# Patient Record
Sex: MALE | Race: WHITE | Employment: OTHER | URBAN - METROPOLITAN AREA
[De-identification: names, ages, dates, MRNs, and addresses within clinical notes are randomized per-mention and may not be internally consistent; named-entity substitution may affect disease eponyms.]

---

## 2018-12-01 ENCOUNTER — PROCEDURE VISIT (OUTPATIENT)
Dept: CARDIOLOGY CLINIC | Age: 73
End: 2018-12-01
Payer: MEDICARE

## 2018-12-01 DIAGNOSIS — R01.1 CARDIAC MURMUR: Primary | ICD-10-CM

## 2018-12-01 LAB
LV EF: 58 %
LVEF MODALITY: NORMAL

## 2018-12-01 PROCEDURE — 93306 TTE W/DOPPLER COMPLETE: CPT | Performed by: INTERNAL MEDICINE

## 2018-12-07 ENCOUNTER — TELEPHONE (OUTPATIENT)
Dept: CARDIOLOGY CLINIC | Age: 73
End: 2018-12-07

## 2018-12-07 NOTE — TELEPHONE ENCOUNTER
Returned call to [facility] informed him that the stress showed a sclerotic aortic valve but everything else is normal. Informed him that aclerotic valve is calcification and thickening of the valve leaflets.

## 2019-06-26 ENCOUNTER — HOSPITAL ENCOUNTER (OUTPATIENT)
Age: 74
Setting detail: SPECIMEN
Discharge: HOME | End: 2019-06-26
Payer: MEDICARE

## 2019-06-26 LAB
HEPATITIS B CORE IGM ANTIBODY: ABNORMAL
HEPATITIS B SURFACE ANTIGEN: NON REACTIVE
LACTATE DEHYDROGENASE: 181 IU/L (ref 120–246)

## 2019-06-26 PROCEDURE — 87389 HIV-1 AG W/HIV-1&-2 AB AG IA: CPT

## 2019-06-26 PROCEDURE — 86705 HEP B CORE ANTIBODY IGM: CPT

## 2019-06-26 PROCEDURE — 87340 HEPATITIS B SURFACE AG IA: CPT

## 2019-06-26 PROCEDURE — 86038 ANTINUCLEAR ANTIBODIES: CPT

## 2019-06-26 PROCEDURE — 87517 HEPATITIS B DNA QUANT: CPT

## 2019-06-26 PROCEDURE — 83615 LACTATE (LD) (LDH) ENZYME: CPT

## 2019-06-26 PROCEDURE — 86430 RHEUMATOID FACTOR TEST QUAL: CPT

## 2019-06-27 LAB — HIV SCREEN: NON REACTIVE

## 2019-06-28 LAB
ANTI-NUCLEAR ANTIBODY (ANA): NORMAL
ANTI-NUCLEAR ANTIBODY (ANA): NORMAL
RHEUMATOID FACTOR: <10 IU/ML (ref 0–14)
RHEUMATOID FACTOR: NORMAL IU/ML (ref 0–14)

## 2019-06-29 LAB
HBV QNT LOG, IU/ML: NOT DETECTED LOG IU/ML
HBV QNT, IU/ML: NOT DETECTED IU/ML
INTERPRETATION: NORMAL
INTERPRETATION: NOT DETECTED

## 2019-10-11 ENCOUNTER — HOSPITAL ENCOUNTER (OUTPATIENT)
Age: 74
Setting detail: SPECIMEN
Discharge: HOME | End: 2019-10-11
Payer: MEDICARE

## 2019-10-11 LAB
ALBUMIN SERPL-MCNC: 3.9 GM/DL (ref 3.4–5)
ALP BLD-CCNC: 110 IU/L (ref 40–129)
ALT SERPL-CCNC: 32 U/L (ref 10–40)
ANION GAP SERPL CALCULATED.3IONS-SCNC: 9 MMOL/L (ref 4–16)
AST SERPL-CCNC: 43 IU/L (ref 15–37)
BILIRUB SERPL-MCNC: 0.8 MG/DL (ref 0–1)
BUN BLDV-MCNC: 10 MG/DL (ref 6–23)
CALCIUM SERPL-MCNC: 9 MG/DL (ref 8.3–10.6)
CHLORIDE BLD-SCNC: 98 MMOL/L (ref 99–110)
CO2: 27 MMOL/L (ref 21–32)
CREAT SERPL-MCNC: 0.9 MG/DL (ref 0.9–1.3)
GFR AFRICAN AMERICAN: >60 ML/MIN/1.73M2
GFR NON-AFRICAN AMERICAN: >60 ML/MIN/1.73M2
GLUCOSE BLD-MCNC: 229 MG/DL (ref 70–99)
POTASSIUM SERPL-SCNC: 4 MMOL/L (ref 3.5–5.1)
SODIUM BLD-SCNC: 134 MMOL/L (ref 135–145)
TOTAL PROTEIN: 6.6 GM/DL (ref 6.4–8.2)

## 2019-10-11 PROCEDURE — 80053 COMPREHEN METABOLIC PANEL: CPT

## 2020-04-23 PROBLEM — D75.89 OTHER SPECIFIED DISEASES OF BLOOD AND BLOOD-FORMING ORGANS: Status: ACTIVE | Noted: 2020-04-23

## 2020-04-23 PROBLEM — D69.6 THROMBOCYTOPENIA, UNSPECIFIED (HCC): Status: ACTIVE | Noted: 2020-04-23

## 2020-06-12 ENCOUNTER — HOSPITAL ENCOUNTER (OUTPATIENT)
Dept: INFUSION THERAPY | Age: 75
Discharge: HOME | End: 2020-06-12
Payer: MEDICARE

## 2020-06-12 LAB
ALBUMIN SERPL-MCNC: 4.1 GM/DL (ref 3.4–5)
ALP BLD-CCNC: 92 IU/L (ref 40–128)
ALT SERPL-CCNC: 22 U/L (ref 10–40)
ANION GAP SERPL CALCULATED.3IONS-SCNC: 8 MMOL/L (ref 4–16)
AST SERPL-CCNC: 29 IU/L (ref 15–37)
BASOPHILS ABSOLUTE: 0 K/CU MM
BASOPHILS RELATIVE PERCENT: 0.5 % (ref 0–1)
BILIRUB SERPL-MCNC: 1 MG/DL (ref 0–1)
BUN BLDV-MCNC: 11 MG/DL (ref 6–23)
CALCIUM SERPL-MCNC: 9.3 MG/DL (ref 8.3–10.6)
CHLORIDE BLD-SCNC: 104 MMOL/L (ref 99–110)
CO2: 24 MMOL/L (ref 21–32)
CREAT SERPL-MCNC: 0.7 MG/DL (ref 0.9–1.3)
DIFFERENTIAL TYPE: ABNORMAL
EOSINOPHILS ABSOLUTE: 0.3 K/CU MM
EOSINOPHILS RELATIVE PERCENT: 6.2 % (ref 0–3)
GFR AFRICAN AMERICAN: >60 ML/MIN/1.73M2
GFR NON-AFRICAN AMERICAN: >60 ML/MIN/1.73M2
GLUCOSE BLD-MCNC: 262 MG/DL (ref 70–99)
HCT VFR BLD CALC: 41.3 % (ref 42–52)
HEMOGLOBIN: 15 GM/DL (ref 13.5–18)
LYMPHOCYTES ABSOLUTE: 1.3 K/CU MM
LYMPHOCYTES RELATIVE PERCENT: 29.6 % (ref 24–44)
MCH RBC QN AUTO: 33.7 PG (ref 27–31)
MCHC RBC AUTO-ENTMCNC: 36.3 % (ref 32–36)
MCV RBC AUTO: 92.8 FL (ref 78–100)
MONOCYTES ABSOLUTE: 0.3 K/CU MM
MONOCYTES RELATIVE PERCENT: 5.9 % (ref 0–4)
PDW BLD-RTO: 13 % (ref 11.7–14.9)
PLATELET # BLD: 126 K/CU MM (ref 140–440)
PMV BLD AUTO: 11.1 FL (ref 7.5–11.1)
POTASSIUM SERPL-SCNC: 4.2 MMOL/L (ref 3.5–5.1)
RBC # BLD: 4.45 M/CU MM (ref 4.6–6.2)
SEGMENTED NEUTROPHILS ABSOLUTE COUNT: 2.4 K/CU MM
SEGMENTED NEUTROPHILS RELATIVE PERCENT: 57.8 % (ref 36–66)
SODIUM BLD-SCNC: 136 MMOL/L (ref 135–145)
TOTAL PROTEIN: 6.9 GM/DL (ref 6.4–8.2)
WBC # BLD: 4.2 K/CU MM (ref 4–10.5)

## 2020-06-12 PROCEDURE — 85025 COMPLETE CBC W/AUTO DIFF WBC: CPT

## 2020-06-12 PROCEDURE — 80053 COMPREHEN METABOLIC PANEL: CPT

## 2020-06-12 PROCEDURE — 36415 COLL VENOUS BLD VENIPUNCTURE: CPT

## 2020-06-19 ENCOUNTER — HOSPITAL ENCOUNTER (OUTPATIENT)
Dept: INFUSION THERAPY | Age: 75
Discharge: HOME | End: 2020-06-19
Payer: MEDICARE

## 2020-06-19 PROCEDURE — 99211 OFF/OP EST MAY X REQ PHY/QHP: CPT

## 2020-12-17 NOTE — PROGRESS NOTES
Patient Name: Thea Mcgrath  Patient : 1945  Patient MRN: [id number]     Primary Oncologist: Alejandra Rosario MD  Referring Provider: Liane Samson MD     Date of Service: 2020      Chief Complaint:   Chief Complaint   Patient presents with    Follow-up     He came in for follow-up visit. Patient Active Problem List:     Other specified diseases of blood and blood-forming organs     Thrombocytopenia, unspecified (HCC)     HPI:   Joseph Vega is a pleasant 78-year-old male patient who was referred for evaluation of thrombocytopenia. CMP in May 2019 was unremarkable. WBC was 3.9, hemoglobin 14.7, hematocrit 42.1, .1, platelets 90. PSA was 1.99. Hepatitis C antibody was negative. On 2019 WBC was 5.2, hemoglobin 14.6, hematocrit 40.9, platelets 81. He drinks 2 to 3 glasses of wine daily since he is retired in 2017. At present time he is cutting down. He was told that he had hepatitis B infection in the past. I reviewed his medication list.  We discussed about potential etiologies of macrocytosis and thrombocytopenia, including alcohol use, liver disease, chronic infection, bone marrow disease and/or autoimmune disorder. .  At one time he was told to have an enlarged liver. He lost weight intentionally. US of abdomen in 2019 was negative for hepatosplenomegaly. HIV screening was negative. Platelet was 90. SOCORRO and RF were negative. CBC in 2019 showed slight decrease of platelet count. No signs of bleeding. He is agreeable to recheck CBC prior to next OV. I may consider bone marrow study if platelet continues to drop. CBC in 2019 was stable. I advise to limit his wine intake to one glass a day. He wants to defer bone marrow study    Labs in 2020 was reviewed. We discussed about diet and positive attitude. He lost 30 lbs intentionally and felt better. Due to low HbA1c at 5.8, PCP discontinued metformin.   On 2020 he came in for follow-up visit. He has nonspecific rash to the right upper extremity. Family doctor has prescribed triamcinolone. He is agreeable to have blood test today. He denied any excessive bruises or bleeding. No melena or hematuria. He denied any NVD. No fever or chills. No acute pain. No depression. Past Medical History  Allergies, osteoarthritis, thrombocytopenia, diabetes, hepatitis B carrier, hypertension. Surgical History  Nasal polyps removal.    Social History  Smoking Status Former smoker, stopped smokin  He drinks 2 to 3 glasses of wine daily since he is retired in 2017. He denies any illicit drug use. He has 1 daughter and 1 grandchild. Family History  Mother had a plastic anemia/leukemia. Nephew had hemophilia. Review of Systems: \"Per interval history; otherwise 10 point ROS is negative. \"     Vital Signs:  /71 (Site: Left Upper Arm, Position: Sitting, Cuff Size: Large Adult)   Pulse 68   Temp 96.9 °F (36.1 °C) (Infrared)   Resp 16   Ht 5' 5.5\" (1.664 m)   Wt 165 lb 9.6 oz (75.1 kg)   SpO2 96%   BMI 27.14 kg/m²     Physical Exam:  CONSTITUTIONAL: awake, alert, cooperative, no apparent distress   EYES: pupils equal, round and reactive to light, sclera clear and conjunctiva normal  ENT: Normocephalic, without obvious abnormality, atraumatic  NECK: supple, symmetrical, no jugular venous distension and no carotid bruits   HEMATOLOGIC/LYMPHATIC: no cervical, supraclavicular or axillary lymphadenopathy   LUNGS: no increased work of breathing and clear to auscultation   CARDIOVASCULAR: regular rate and rhythm, normal S1 and S2, no murmur noted  ABDOMEN: normal bowel sounds x 4, soft, non-distended, non-tender, no masses palpated, no hepatosplenomegaly   MUSCULOSKELETAL: full range of motion noted, tone is normal  NEUROLOGIC: awake, alert, oriented to name, place and time. Motor skills grossly intact. SKIN: Normal skin color, texture, turgor and no jaundice.  appears intact EXTREMITIES: no LE edema. No cyanosis. Labs:  Hematology:  Lab Results   Component Value Date    WBC 6.0 12/21/2020    RBC 4.85 12/21/2020    HGB 16.2 12/21/2020    HCT 44.3 12/21/2020    MCV 91.3 12/21/2020    MCH 33.4 (H) 12/21/2020    MCHC 36.6 (H) 12/21/2020    RDW 13.1 12/21/2020    PLT 81 (L) 12/21/2020    MPV 11.1 12/21/2020    SEGSPCT 59.5 12/21/2020    EOSRELPCT 4.5 (H) 12/21/2020    BASOPCT 0.5 12/21/2020    LYMPHOPCT 26.8 12/21/2020    MONOPCT 8.7 (H) 12/21/2020    SEGSABS 3.6 12/21/2020    EOSABS 0.3 12/21/2020    BASOSABS 0.0 12/21/2020    LYMPHSABS 1.6 12/21/2020    MONOSABS 0.5 12/21/2020    DIFFTYPE AUTOMATED DIFFERENTIAL 12/21/2020     Chemistry:  Lab Results   Component Value Date     06/12/2020    K 4.2 06/12/2020     06/12/2020    CO2 24 06/12/2020    BUN 11 06/12/2020    CREATININE 0.7 (L) 06/12/2020    GLUCOSE 262 (H) 06/12/2020    CALCIUM 9.3 06/12/2020    PROT 6.9 06/12/2020    LABALBU 4.1 06/12/2020    BILITOT 1.0 06/12/2020    ALKPHOS 92 06/12/2020    AST 29 06/12/2020    ALT 22 06/12/2020    LABGLOM >60 06/12/2020    GFRAA >60 06/12/2020     Lab Results   Component Value Date     06/26/2019     Immunology:  Lab Results   Component Value Date    PROT 6.9 06/12/2020      Observations:  No data recorded      Assessment & Plan:    1. He was referred for evaluation of mild thrombocytopenia. This could be related to autoimmune mediated process. I mentioned alcohol may contribute to thrombocytopenia. He is cutting down alcohol consumption. Ultrasound of liver and spleen in July 2019 showed normal liver and spleen with calcified slenic granuloma. CBC in July 2019 showed plaltelet  ~90, LDH was normal. If he has worsening thrombocytopenia, I may consider bone marrow study. HIV screening, SOCORRO, rheumatoid factor were negative. CBC in December 2019 was reviewed. Labs in June 2020 was reviewed. I will check CBC today.   I may consider bone marrow study if platelet continues to drop. 2. We discussed about healthy diet and lifestyle. Macrocytosis could be related to alcohol intake. I advise to limit alcohol intake. He stopped drinking. Return to clinic in 3 months or sooner. All of his questions have been answered for today. Recent imaging and labs were reviewed and discussed with the patient.       Electronically signed by Griffin Coleman MD on 12/17/20 at 9:37 AM EST

## 2020-12-21 ENCOUNTER — HOSPITAL ENCOUNTER (OUTPATIENT)
Dept: INFUSION THERAPY | Age: 75
Discharge: HOME | End: 2020-12-21
Payer: MEDICARE

## 2020-12-21 ENCOUNTER — OFFICE VISIT (OUTPATIENT)
Dept: ONCOLOGY | Age: 75
End: 2020-12-21
Payer: MEDICARE

## 2020-12-21 VITALS
BODY MASS INDEX: 26.61 KG/M2 | HEIGHT: 66 IN | OXYGEN SATURATION: 96 % | RESPIRATION RATE: 16 BRPM | WEIGHT: 165.6 LBS | TEMPERATURE: 96.9 F | SYSTOLIC BLOOD PRESSURE: 136 MMHG | HEART RATE: 68 BPM | DIASTOLIC BLOOD PRESSURE: 71 MMHG

## 2020-12-21 DIAGNOSIS — D69.6 THROMBOCYTOPENIA, UNSPECIFIED (HCC): ICD-10-CM

## 2020-12-21 DIAGNOSIS — D69.6 THROMBOCYTOPENIA, UNSPECIFIED (HCC): Primary | ICD-10-CM

## 2020-12-21 LAB
ALBUMIN SERPL-MCNC: 4.2 GM/DL (ref 3.4–5)
ALP BLD-CCNC: 113 IU/L (ref 40–129)
ALT SERPL-CCNC: 31 U/L (ref 10–40)
ANION GAP SERPL CALCULATED.3IONS-SCNC: 14 MMOL/L (ref 4–16)
AST SERPL-CCNC: 35 IU/L (ref 15–37)
BASOPHILS ABSOLUTE: 0 K/CU MM
BASOPHILS RELATIVE PERCENT: 0.5 % (ref 0–1)
BILIRUB SERPL-MCNC: 0.9 MG/DL (ref 0–1)
BUN BLDV-MCNC: 7 MG/DL (ref 6–23)
CALCIUM SERPL-MCNC: 9.9 MG/DL (ref 8.3–10.6)
CHLORIDE BLD-SCNC: 105 MMOL/L (ref 99–110)
CO2: 23 MMOL/L (ref 21–32)
CREAT SERPL-MCNC: 0.8 MG/DL (ref 0.9–1.3)
DIFFERENTIAL TYPE: ABNORMAL
EOSINOPHILS ABSOLUTE: 0.3 K/CU MM
EOSINOPHILS RELATIVE PERCENT: 4.5 % (ref 0–3)
GFR AFRICAN AMERICAN: >60 ML/MIN/1.73M2
GFR NON-AFRICAN AMERICAN: >60 ML/MIN/1.73M2
GLUCOSE BLD-MCNC: 131 MG/DL (ref 70–99)
HCT VFR BLD CALC: 44.3 % (ref 42–52)
HEMOGLOBIN: 16.2 GM/DL (ref 13.5–18)
LYMPHOCYTES ABSOLUTE: 1.6 K/CU MM
LYMPHOCYTES RELATIVE PERCENT: 26.8 % (ref 24–44)
MCH RBC QN AUTO: 33.4 PG (ref 27–31)
MCHC RBC AUTO-ENTMCNC: 36.6 % (ref 32–36)
MCV RBC AUTO: 91.3 FL (ref 78–100)
MONOCYTES ABSOLUTE: 0.5 K/CU MM
MONOCYTES RELATIVE PERCENT: 8.7 % (ref 0–4)
PDW BLD-RTO: 13.1 % (ref 11.7–14.9)
PLATELET # BLD: 81 K/CU MM (ref 140–440)
PMV BLD AUTO: 11.1 FL (ref 7.5–11.1)
POTASSIUM SERPL-SCNC: 4 MMOL/L (ref 3.5–5.1)
RBC # BLD: 4.85 M/CU MM (ref 4.6–6.2)
SEGMENTED NEUTROPHILS ABSOLUTE COUNT: 3.6 K/CU MM
SEGMENTED NEUTROPHILS RELATIVE PERCENT: 59.5 % (ref 36–66)
SODIUM BLD-SCNC: 142 MMOL/L (ref 135–145)
TOTAL PROTEIN: 7.1 GM/DL (ref 6.4–8.2)
WBC # BLD: 6 K/CU MM (ref 4–10.5)

## 2020-12-21 PROCEDURE — 99213 OFFICE O/P EST LOW 20 MIN: CPT | Performed by: INTERNAL MEDICINE

## 2020-12-21 PROCEDURE — 80053 COMPREHEN METABOLIC PANEL: CPT

## 2020-12-21 PROCEDURE — 99211 OFF/OP EST MAY X REQ PHY/QHP: CPT

## 2020-12-21 PROCEDURE — 85025 COMPLETE CBC W/AUTO DIFF WBC: CPT

## 2020-12-21 PROCEDURE — 36415 COLL VENOUS BLD VENIPUNCTURE: CPT

## 2020-12-21 RX ORDER — GABAPENTIN 300 MG/1
300 CAPSULE ORAL 3 TIMES DAILY
COMMUNITY
End: 2021-10-04 | Stop reason: ALTCHOICE

## 2020-12-21 RX ORDER — TAMSULOSIN HYDROCHLORIDE 0.4 MG/1
0.4 CAPSULE ORAL DAILY
COMMUNITY

## 2020-12-21 RX ORDER — ATORVASTATIN CALCIUM 10 MG/1
10 TABLET, FILM COATED ORAL DAILY
COMMUNITY

## 2020-12-21 NOTE — PROGRESS NOTES
MA Rooming Questions  Patient: Bharti Myers  MRN: [id number]    Date: 12/21/2020        1. Do you have any new issues? yes -          2. Do you need any refills on medications?    no    3. Have you had any imaging done since your last visit?   no    4. Have you been hospitalized or seen in the emergency room since your last visit here?   no    5. Did the patient have a depression screening completed today?  No    No data recorded     PHQ-9 Given to (if applicable):               PHQ-9 Score (if applicable):                     [] Positive     []  Negative              Does question #9 need addressed (if applicable)                     [] Yes    []  No               Sheasanjeev Holt MA

## 2020-12-22 ENCOUNTER — TELEPHONE (OUTPATIENT)
Dept: ONCOLOGY | Age: 75
End: 2020-12-22

## 2020-12-22 NOTE — TELEPHONE ENCOUNTER
Patient would like for you to call him and his wife, they have some questions about his platelet counts, please call

## 2021-01-15 ENCOUNTER — TELEPHONE (OUTPATIENT)
Dept: ONCOLOGY | Age: 76
End: 2021-01-15

## 2021-01-15 NOTE — TELEPHONE ENCOUNTER
Per Dr. Arcadio Jiménez of for patient to get covid vaccine. I called and spoke to patient wife and advised. She voiced understanding.

## 2021-03-02 NOTE — PROGRESS NOTES
Patient Name: Jono Arango  Patient : 1945  Patient MRN: [id number]     Primary Oncologist: Galina Arroyo MD  Referring Provider: Baltazar Huber MD     Date of Service: 3/22/2021      Chief Complaint:   Chief Complaint   Patient presents with    Follow-up    Results     He came in for follow-up visit. Patient Active Problem List:     Other specified diseases of blood and blood-forming organs     Thrombocytopenia, unspecified (HCC)     HPI:   Elba Samson is a pleasant 62-year-old male patient who was referred for evaluation of thrombocytopenia. CMP in May 2019 was unremarkable. WBC was 3.9, hemoglobin 14.7, hematocrit 42.1, .1, platelets 90. PSA was 1.99. Hepatitis C antibody was negative. On 2019 WBC was 5.2, hemoglobin 14.6, hematocrit 40.9, platelets 81. He drinks 2 to 3 glasses of wine daily since he is retired in 2017. At present time he is cutting down. He was told that he had hepatitis B infection in the past. I reviewed his medication list.  We discussed about potential etiologies of macrocytosis and thrombocytopenia, including alcohol use, liver disease, chronic infection, bone marrow disease and/or autoimmune disorder. .  At one time he was told to have an enlarged liver. He lost weight intentionally. US of abdomen in 2019 was negative for hepatosplenomegaly. HIV screening was negative. Platelet was 90. SOCORRO and RF were negative. CBC in 2019 showed slight decrease of platelet count. I may consider bone marrow study if platelet continues to drop. CBC in 2019 was stable. I advise to limit his wine intake to one glass a day. He wants to defer bone marrow study    Labs in 2020 was reviewed. We discussed about diet and positive attitude. He lost 30 lbs intentionally and felt better. Due to low HbA1c at 5.8, PCP discontinued metformin. On 2020 when he came in for follow-up visit.   He has had hematuria in the last 2 days and also dysuria toward the end of the urinary stream.  I will put him on Cipro and refer to urologist for further work-up. On 2021 WBC was 4.9, hemoglobin 16, platelet 82. He is on Flomax. He denied taking any NSAIDs or blood thinners. Denied any nosebleed or gums bleed. He denied any NVD. No fever or chills. He denied any acute pain. No headaches or dizzy spell. No depression. Past Medical History  Allergies, osteoarthritis, thrombocytopenia, diabetes, hepatitis B carrier, hypertension. History of vasectomy. Surgical History  Nasal polyps removal.    Social History  Smoking Status Former smoker, stopped smokin  He drinks 2 to 3 glasses of wine daily since he is retired in 2017. He denies any illicit drug use. He has 1 daughter and 1 grandchild. Family History  Mother had a plastic anemia/leukemia. Nephew had hemophilia. Review of Systems: \"Per interval history; otherwise 10 point ROS is negative. \"     Vital Signs:  BP (!) 162/75 (Site: Right Upper Arm, Position: Sitting, Cuff Size: Medium Adult)   Pulse 67   Temp 98.9 °F (37.2 °C) (Temporal)   Resp 16   Ht 5' 5.5\" (1.664 m)   Wt 167 lb 6.4 oz (75.9 kg)   SpO2 98%   BMI 27.43 kg/m²     Physical Exam:  CONSTITUTIONAL: awake, alert, cooperative, no apparent distress   EYES: pupils equal, round and reactive to light, sclera clear and conjunctiva normal  ENT: Normocephalic, without obvious abnormality, atraumatic  NECK: supple, symmetrical, no jugular venous distension and no carotid bruits   HEMATOLOGIC/LYMPHATIC: no cervical, supraclavicular or axillary lymphadenopathy   LUNGS: no increased work of breathing and clear to auscultation   CARDIOVASCULAR: regular rate and rhythm, normal S1 and S2, no murmur noted  ABDOMEN: normal bowel sounds x 4, soft, non-distended, non-tender, no masses palpated, no hepatosplenomegaly   MUSCULOSKELETAL: full range of motion noted, tone is normal  NEUROLOGIC: awake, alert, oriented to name, place and time. Cranial nerves II through XII grossly intact. SKIN: Normal skin color, texture, turgor and no jaundice. appears intact   EXTREMITIES: no LE edema. No cyanosis. Labs:  Hematology:  Lab Results   Component Value Date    WBC 4.9 03/22/2021    RBC 4.81 03/22/2021    HGB 16.0 03/22/2021    HCT 43.6 03/22/2021    MCV 90.6 03/22/2021    MCH 33.3 (H) 03/22/2021    MCHC 36.7 (H) 03/22/2021    RDW 13.4 03/22/2021    PLT 82 (L) 03/22/2021    MPV 10.7 03/22/2021    SEGSPCT 55.5 03/22/2021    EOSRELPCT 4.3 (H) 03/22/2021    BASOPCT 0.4 03/22/2021    LYMPHOPCT 32.8 03/22/2021    MONOPCT 7.0 (H) 03/22/2021    SEGSABS 2.7 03/22/2021    EOSABS 0.2 03/22/2021    BASOSABS 0.0 03/22/2021    LYMPHSABS 1.6 03/22/2021    MONOSABS 0.3 03/22/2021    DIFFTYPE AUTOMATED DIFFERENTIAL 03/22/2021     Chemistry:  Lab Results   Component Value Date     12/21/2020    K 4.0 12/21/2020     12/21/2020    CO2 23 12/21/2020    BUN 7 12/21/2020    CREATININE 0.8 (L) 12/21/2020    GLUCOSE 131 (H) 12/21/2020    CALCIUM 9.9 12/21/2020    PROT 7.1 12/21/2020    LABALBU 4.2 12/21/2020    BILITOT 0.9 12/21/2020    ALKPHOS 113 12/21/2020    AST 35 12/21/2020    ALT 31 12/21/2020    LABGLOM >60 12/21/2020    GFRAA >60 12/21/2020     Lab Results   Component Value Date     06/26/2019     Immunology:  Lab Results   Component Value Date    PROT 7.1 12/21/2020      Observations:  PHQ-9 Total Score: 0 (3/22/2021 10:23 AM)      Assessment & Plan:    1. He was referred for evaluation of mild thrombocytopenia. This could be related to autoimmune mediated process. I mentioned alcohol may contribute to thrombocytopenia. He is cutting down alcohol consumption. Ultrasound of liver and spleen in July 2019 showed normal liver and spleen with calcified slenic granuloma. CBC in July 2019 showed plaltelet  ~90, LDH was normal. If he has worsening thrombocytopenia, I may consider bone marrow study.  HIV screening, SOCORRO, rheumatoid factor were negative. CBC in December 2019 was reviewed. Labs in June 2020 was reviewed. CBC in March 2021 was stable. I may consider bone marrow study if platelet continues to drop. 2.  He has episodes of hematuria with dysuria. I put him on empiric antibiotic and referred to urologist for further work-up. 3. We discussed about healthy diet and lifestyle. Macrocytosis could be related to alcohol intake. I advise to limit alcohol intake. He stopped drinking. Return to clinic in 3 weeks or sooner. All of his questions have been answered for today. Recent imaging and labs were reviewed and discussed with the patient.       Electronically signed by Alicia Kirby MD on 12/17/20 at 9:37 AM EST

## 2021-03-22 ENCOUNTER — HOSPITAL ENCOUNTER (OUTPATIENT)
Dept: INFUSION THERAPY | Age: 76
Discharge: HOME | End: 2021-03-22
Payer: MEDICARE

## 2021-03-22 ENCOUNTER — OFFICE VISIT (OUTPATIENT)
Dept: ONCOLOGY | Age: 76
End: 2021-03-22
Payer: MEDICARE

## 2021-03-22 VITALS
HEART RATE: 67 BPM | OXYGEN SATURATION: 98 % | TEMPERATURE: 98.9 F | DIASTOLIC BLOOD PRESSURE: 75 MMHG | SYSTOLIC BLOOD PRESSURE: 162 MMHG | BODY MASS INDEX: 26.9 KG/M2 | HEIGHT: 66 IN | WEIGHT: 167.4 LBS | RESPIRATION RATE: 16 BRPM

## 2021-03-22 DIAGNOSIS — D69.6 THROMBOCYTOPENIA, UNSPECIFIED (HCC): ICD-10-CM

## 2021-03-22 DIAGNOSIS — R31.9 HEMATURIA, UNSPECIFIED TYPE: Primary | ICD-10-CM

## 2021-03-22 LAB
ALBUMIN SERPL-MCNC: 4.1 GM/DL (ref 3.4–5)
ALP BLD-CCNC: 99 IU/L (ref 40–128)
ALT SERPL-CCNC: 27 U/L (ref 10–40)
ANION GAP SERPL CALCULATED.3IONS-SCNC: 10 MMOL/L (ref 4–16)
AST SERPL-CCNC: 29 IU/L (ref 15–37)
BASOPHILS ABSOLUTE: 0 K/CU MM
BASOPHILS RELATIVE PERCENT: 0.4 % (ref 0–1)
BILIRUB SERPL-MCNC: 1.1 MG/DL (ref 0–1)
BUN BLDV-MCNC: 9 MG/DL (ref 6–23)
CALCIUM SERPL-MCNC: 9.4 MG/DL (ref 8.3–10.6)
CHLORIDE BLD-SCNC: 102 MMOL/L (ref 99–110)
CO2: 27 MMOL/L (ref 21–32)
CREAT SERPL-MCNC: 0.8 MG/DL (ref 0.9–1.3)
DIFFERENTIAL TYPE: ABNORMAL
EOSINOPHILS ABSOLUTE: 0.2 K/CU MM
EOSINOPHILS RELATIVE PERCENT: 4.3 % (ref 0–3)
GFR AFRICAN AMERICAN: >60 ML/MIN/1.73M2
GFR NON-AFRICAN AMERICAN: >60 ML/MIN/1.73M2
GLUCOSE BLD-MCNC: 306 MG/DL (ref 70–99)
HCT VFR BLD CALC: 43.6 % (ref 42–52)
HEMOGLOBIN: 16 GM/DL (ref 13.5–18)
LYMPHOCYTES ABSOLUTE: 1.6 K/CU MM
LYMPHOCYTES RELATIVE PERCENT: 32.8 % (ref 24–44)
MCH RBC QN AUTO: 33.3 PG (ref 27–31)
MCHC RBC AUTO-ENTMCNC: 36.7 % (ref 32–36)
MCV RBC AUTO: 90.6 FL (ref 78–100)
MONOCYTES ABSOLUTE: 0.3 K/CU MM
MONOCYTES RELATIVE PERCENT: 7 % (ref 0–4)
PDW BLD-RTO: 13.4 % (ref 11.7–14.9)
PLATELET # BLD: 82 K/CU MM (ref 140–440)
PMV BLD AUTO: 10.7 FL (ref 7.5–11.1)
POTASSIUM SERPL-SCNC: 4 MMOL/L (ref 3.5–5.1)
RBC # BLD: 4.81 M/CU MM (ref 4.6–6.2)
SEGMENTED NEUTROPHILS ABSOLUTE COUNT: 2.7 K/CU MM
SEGMENTED NEUTROPHILS RELATIVE PERCENT: 55.5 % (ref 36–66)
SODIUM BLD-SCNC: 139 MMOL/L (ref 135–145)
TOTAL PROTEIN: 7 GM/DL (ref 6.4–8.2)
WBC # BLD: 4.9 K/CU MM (ref 4–10.5)

## 2021-03-22 PROCEDURE — 36415 COLL VENOUS BLD VENIPUNCTURE: CPT

## 2021-03-22 PROCEDURE — 80053 COMPREHEN METABOLIC PANEL: CPT

## 2021-03-22 PROCEDURE — 85025 COMPLETE CBC W/AUTO DIFF WBC: CPT

## 2021-03-22 PROCEDURE — 99214 OFFICE O/P EST MOD 30 MIN: CPT | Performed by: INTERNAL MEDICINE

## 2021-03-22 PROCEDURE — 99211 OFF/OP EST MAY X REQ PHY/QHP: CPT

## 2021-03-22 RX ORDER — CIPROFLOXACIN 250 MG/1
250 TABLET, FILM COATED ORAL 2 TIMES DAILY
Qty: 10 TABLET | Refills: 0 | Status: SHIPPED | OUTPATIENT
Start: 2021-03-22 | End: 2021-03-27

## 2021-03-22 RX ORDER — BLOOD SUGAR DIAGNOSTIC
STRIP MISCELLANEOUS
COMMUNITY
Start: 2021-01-25 | End: 2022-08-04

## 2021-03-22 RX ORDER — UBIQUINOL 100 MG
CAPSULE ORAL
COMMUNITY
Start: 2021-01-25 | End: 2022-08-04

## 2021-03-22 ASSESSMENT — PATIENT HEALTH QUESTIONNAIRE - PHQ9
SUM OF ALL RESPONSES TO PHQ9 QUESTIONS 1 & 2: 0
SUM OF ALL RESPONSES TO PHQ QUESTIONS 1-9: 0

## 2021-03-22 NOTE — PROGRESS NOTES
MA Rooming Questions  Patient: Marion Mcmahon  MRN: [id number]    Date: 3/22/2021        1. Do you have any new issues? yes - has some blood in urine          2. Do you need any refills on medications?    no    3. Have you had any imaging done since your last visit?   no    4. Have you been hospitalized or seen in the emergency room since your last visit here?   no    5. Did the patient have a depression screening completed today?  No    PHQ-9 Total Score: 0 (3/22/2021 10:23 AM)       PHQ-9 Given to (if applicable):               PHQ-9 Score (if applicable):                     [] Positive     []  Negative              Does question #9 need addressed (if applicable)                     [] Yes    []  No               Bernabe Salinas MA

## 2021-04-09 NOTE — PROGRESS NOTES
Patient Name: Hermila Cason  Patient : 1945  Patient MRN: [id number]     Primary Oncologist: Yanna Calix MD  Referring Provider: Anais Laird MD     Date of Service: 2021      Chief Complaint:   Chief Complaint   Patient presents with   [address]     He came in for follow-up visit. Patient Active Problem List:     Other specified diseases of blood and blood-forming organs     Thrombocytopenia, unspecified (HCC)     HPI:   Phoebe Heaton is a pleasant 80-year-old male patient who was referred for evaluation of thrombocytopenia. CMP in May 2019 was unremarkable. WBC was 3.9, hemoglobin 14.7, hematocrit 42.1, .1, platelets 90. PSA was 1.99. Hepatitis C antibody was negative. On 2019 WBC was 5.2, hemoglobin 14.6, hematocrit 40.9, platelets 81. He drinks 2 to 3 glasses of wine daily since he is retired in 2017. At present time he is cutting down. He was told that he had hepatitis B infection in the past. I reviewed his medication list.  We discussed about potential etiologies of macrocytosis and thrombocytopenia, including alcohol use, liver disease, chronic infection, bone marrow disease and/or autoimmune disorder. .  At one time he was told to have an enlarged liver. He lost weight intentionally. US of abdomen in 2019 was negative for hepatosplenomegaly. HIV screening was negative. Platelet was 90. SOCORRO and RF were negative. CBC in 2019 showed slight decrease of platelet count. I may consider bone marrow study if platelet continues to drop. CBC in 2019 was stable. I advise to limit his wine intake to one glass a day. He wants to defer bone marrow study    Labs in 2020 was reviewed. We discussed about diet and positive attitude. He lost 30 lbs intentionally and felt better. Due to low HbA1c at 5.8, PCP discontinued metformin.     On 2021 he had hematuria in the last 2 days and also dysuria toward the end of the urinary stream.  I put him on Cipro and referred to urologist for further work-up. On 2021 WBC was 4.9, hemoglobin 16, platelet 82. He is on Flomax. He denied taking any NSAIDs or blood thinners. On 2021 he came in for follow-up visit. He was seen by Dr. Jhonathan Sutton who plans to do resection of the bladder masses. 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 Since the last time, he denied further hematuria. I will check CBC and CMP today. Likely he will have cystoscopy and removal of the bladder masses on May 14, 2021. He denied any nosebleed or gums bleed. No nausea, vomiting or diarrhea. . No fever or chills. He denied any acute pain. No headaches or dizzy spell. No depression. Past Medical History  Allergies, osteoarthritis, thrombocytopenia, diabetes, hepatitis B carrier, hypertension. History of vasectomy. Surgical History  Nasal polyps removal.    Social History  Smoking Status Former smoker, stopped smokin  He drinks 2 to 3 glasses of wine daily since he is retired in 2017. He denies any illicit drug use. He has 1 daughter and 1 grandchild. Family History  Mother had a plastic anemia/leukemia. Nephew had hemophilia. Review of Systems: \"Per interval history; otherwise 10 point ROS is negative. \"     Vital Signs:  /76 (Site: Right Upper Arm, Position: Sitting, Cuff Size: Large Adult)   Pulse 62   Temp 97.8 °F (36.6 °C) (Infrared)   Resp 16   Ht 5' 5\" (1.651 m)   Wt 166 lb 3.2 oz (75.4 kg)   SpO2 95%   BMI 27.66 kg/m²     Physical Exam:  CONSTITUTIONAL: awake, alert, cooperative, no apparent distress   EYES: pupils equal, round and reactive to light, sclera clear and conjunctiva normal  ENT: Normocephalic, without obvious abnormality, atraumatic  NECK: supple, symmetrical, no jugular venous distension and no carotid bruits   HEMATOLOGIC/LYMPHATIC: no cervical, supraclavicular or axillary lymphadenopathy   LUNGS: no increased work of breathing and clear to auscultation   CARDIOVASCULAR: regular rate and rhythm, normal S1 and S2, no murmur noted  ABDOMEN: normal bowel sounds x 4, soft, non-distended, non-tender, no masses palpated, no hepatosplenomegaly   MUSCULOSKELETAL: full range of motion noted, tone is normal  NEUROLOGIC: awake, alert, oriented to name, place and time. Cranial nerves II through XII grossly intact. SKIN: Normal skin color, texture, turgor and no jaundice. appears intact   EXTREMITIES: + LE edema. No cyanosis. Labs:  Hematology:  Lab Results   Component Value Date    WBC 7.9 04/23/2021    RBC 4.96 04/23/2021    HGB 16.6 04/23/2021    HCT 45.9 04/23/2021    MCV 92.5 04/23/2021    MCH 33.5 (H) 04/23/2021    MCHC 36.2 (H) 04/23/2021    RDW 13.2 04/23/2021    PLT 76 (L) 04/23/2021    MPV 10.4 04/23/2021    SEGSPCT 67.5 (H) 04/23/2021    EOSRELPCT 2.0 04/23/2021    BASOPCT 0.4 04/23/2021    LYMPHOPCT 21.4 (L) 04/23/2021    MONOPCT 8.7 (H) 04/23/2021    SEGSABS 5.4 04/23/2021    EOSABS 0.2 04/23/2021    BASOSABS 0.0 04/23/2021    LYMPHSABS 1.7 04/23/2021    MONOSABS 0.7 04/23/2021    DIFFTYPE AUTOMATED DIFFERENTIAL 04/23/2021     Chemistry:  Lab Results   Component Value Date     03/22/2021    K 4.0 03/22/2021     03/22/2021    CO2 27 03/22/2021    BUN 9 03/22/2021    CREATININE 0.8 (L) 03/22/2021    GLUCOSE 306 (H) 03/22/2021    CALCIUM 9.4 03/22/2021    PROT 7.0 03/22/2021    LABALBU 4.1 03/22/2021    BILITOT 1.1 (H) 03/22/2021    ALKPHOS 99 03/22/2021    AST 29 03/22/2021    ALT 27 03/22/2021    LABGLOM >60 03/22/2021    GFRAA >60 03/22/2021     Lab Results   Component Value Date     06/26/2019     Immunology:  Lab Results   Component Value Date    PROT 7.0 03/22/2021      Observations:  PHQ-9 Total Score: 0 (4/23/2021 11:27 AM)      Assessment & Plan:    1. He was referred for evaluation of thrombocytopenia. This could be related to autoimmune mediated process. I mentioned alcohol may contribute to thrombocytopenia. He is cutting down alcohol consumption. Ultrasound of liver and spleen in July 2019 showed normal liver and spleen with calcified slenic granuloma. CBC in July 2019 showed plaltelet  ~90, LDH was normal. If he has worsening thrombocytopenia, I may consider bone marrow study. HIV screening, SCOORRO, rheumatoid factor were negative. CBC in December 2019 was reviewed. Labs in June 2020 was reviewed. CBC in March 2021 was stable. I may consider bone marrow study if platelet continues to drop. I will check labs today. Advised to call for the test result. 2.  He has episodes of hematuria with dysuria. He was found to have 2 bladder masses. He is scheduled for cystoscopy and removal of the bladder masses in May 2021.    3.  Macrocytosis could be related to alcohol intake. I advise to limit alcohol intake. He stopped drinking. We discussed about healthy diet and lifestyle. Return to clinic in 4 weeks or sooner. All of his questions have been answered for today. Recent imaging and labs were reviewed and discussed with the patient.       Electronically signed by Lauri Rashid MD on 12/17/20 at 9:37 AM EST

## 2021-04-23 ENCOUNTER — HOSPITAL ENCOUNTER (OUTPATIENT)
Dept: INFUSION THERAPY | Age: 76
Discharge: HOME | End: 2021-04-23
Payer: MEDICARE

## 2021-04-23 ENCOUNTER — OFFICE VISIT (OUTPATIENT)
Dept: ONCOLOGY | Age: 76
End: 2021-04-23
Payer: MEDICARE

## 2021-04-23 VITALS
BODY MASS INDEX: 27.69 KG/M2 | HEART RATE: 62 BPM | DIASTOLIC BLOOD PRESSURE: 76 MMHG | SYSTOLIC BLOOD PRESSURE: 139 MMHG | WEIGHT: 166.2 LBS | TEMPERATURE: 97.8 F | HEIGHT: 65 IN | RESPIRATION RATE: 16 BRPM | OXYGEN SATURATION: 95 %

## 2021-04-23 DIAGNOSIS — D69.6 THROMBOCYTOPENIA, UNSPECIFIED (HCC): Primary | ICD-10-CM

## 2021-04-23 DIAGNOSIS — D69.6 THROMBOCYTOPENIA, UNSPECIFIED (HCC): ICD-10-CM

## 2021-04-23 LAB
ALBUMIN SERPL-MCNC: 4.3 GM/DL (ref 3.4–5)
ALP BLD-CCNC: 103 IU/L (ref 40–128)
ALT SERPL-CCNC: 22 U/L (ref 10–40)
ANION GAP SERPL CALCULATED.3IONS-SCNC: 12 MMOL/L (ref 4–16)
AST SERPL-CCNC: 28 IU/L (ref 15–37)
BASOPHILS ABSOLUTE: 0 K/CU MM
BASOPHILS RELATIVE PERCENT: 0.4 % (ref 0–1)
BILIRUB SERPL-MCNC: 1.2 MG/DL (ref 0–1)
BUN BLDV-MCNC: 8 MG/DL (ref 6–23)
CALCIUM SERPL-MCNC: 9.4 MG/DL (ref 8.3–10.6)
CHLORIDE BLD-SCNC: 99 MMOL/L (ref 99–110)
CO2: 27 MMOL/L (ref 21–32)
CREAT SERPL-MCNC: 0.8 MG/DL (ref 0.9–1.3)
DIFFERENTIAL TYPE: ABNORMAL
EOSINOPHILS ABSOLUTE: 0.2 K/CU MM
EOSINOPHILS RELATIVE PERCENT: 2 % (ref 0–3)
GFR AFRICAN AMERICAN: >60 ML/MIN/1.73M2
GFR NON-AFRICAN AMERICAN: >60 ML/MIN/1.73M2
GLUCOSE BLD-MCNC: 143 MG/DL (ref 70–99)
HCT VFR BLD CALC: 45.9 % (ref 42–52)
HEMOGLOBIN: 16.6 GM/DL (ref 13.5–18)
LYMPHOCYTES ABSOLUTE: 1.7 K/CU MM
LYMPHOCYTES RELATIVE PERCENT: 21.4 % (ref 24–44)
MCH RBC QN AUTO: 33.5 PG (ref 27–31)
MCHC RBC AUTO-ENTMCNC: 36.2 % (ref 32–36)
MCV RBC AUTO: 92.5 FL (ref 78–100)
MONOCYTES ABSOLUTE: 0.7 K/CU MM
MONOCYTES RELATIVE PERCENT: 8.7 % (ref 0–4)
PDW BLD-RTO: 13.2 % (ref 11.7–14.9)
PLATELET # BLD: 76 K/CU MM (ref 140–440)
PMV BLD AUTO: 10.4 FL (ref 7.5–11.1)
POTASSIUM SERPL-SCNC: 4 MMOL/L (ref 3.5–5.1)
RBC # BLD: 4.96 M/CU MM (ref 4.6–6.2)
SEGMENTED NEUTROPHILS ABSOLUTE COUNT: 5.4 K/CU MM
SEGMENTED NEUTROPHILS RELATIVE PERCENT: 67.5 % (ref 36–66)
SODIUM BLD-SCNC: 138 MMOL/L (ref 135–145)
TOTAL PROTEIN: 7.2 GM/DL (ref 6.4–8.2)
WBC # BLD: 7.9 K/CU MM (ref 4–10.5)

## 2021-04-23 PROCEDURE — 99211 OFF/OP EST MAY X REQ PHY/QHP: CPT

## 2021-04-23 PROCEDURE — 99213 OFFICE O/P EST LOW 20 MIN: CPT | Performed by: INTERNAL MEDICINE

## 2021-04-23 PROCEDURE — 85025 COMPLETE CBC W/AUTO DIFF WBC: CPT

## 2021-04-23 PROCEDURE — 36415 COLL VENOUS BLD VENIPUNCTURE: CPT

## 2021-04-23 PROCEDURE — 80053 COMPREHEN METABOLIC PANEL: CPT

## 2021-04-23 ASSESSMENT — PATIENT HEALTH QUESTIONNAIRE - PHQ9
SUM OF ALL RESPONSES TO PHQ QUESTIONS 1-9: 0
SUM OF ALL RESPONSES TO PHQ9 QUESTIONS 1 & 2: 0
2. FEELING DOWN, DEPRESSED OR HOPELESS: 0
SUM OF ALL RESPONSES TO PHQ QUESTIONS 1-9: 0
SUM OF ALL RESPONSES TO PHQ QUESTIONS 1-9: 0

## 2021-04-23 NOTE — PROGRESS NOTES
MA Rooming Questions  Patient: Jas Caba  MRN: [id number]    Date: 4/23/2021        1. Do you have any new issues?   no         2. Do you need any refills on medications?    no    3. Have you had any imaging done since your last visit?   no    4. Have you been hospitalized or seen in the emergency room since your last visit here?   no    5. Did the patient have a depression screening completed today?  Yes    PHQ-9 Total Score: 0 (4/23/2021 11:27 AM)       PHQ-9 Given to (if applicable):               PHQ-9 Score (if applicable):                     [] Positive     []  Negative              Does question #9 need addressed (if applicable)                     [] Yes    []  No               Destiny Martínez CMA

## 2021-05-24 NOTE — PROGRESS NOTES
on Cipro and referred to urologist for further work-up. On 2021 WBC was 4.9, hemoglobin 16, platelet 82. He is on Flomax. He denied taking any NSAIDs or blood thinners. He was seen by Dr. Yesica Olvera who planned to do resection of the bladder masses. 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 On 2021 he came in for follow up visit. She has cystoscopy/TURBT on 2021. 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 He is scheduled for follow-up cystoscopy in 3 months. He is agreeable to have labs today. Hematuria has resolved. No nosebleed or gums bleed. Denied any nausea, vomiting or diarrhea. . No fever or chills. He denied any acute pain. No headaches or dizzy spell. No depression. Past Medical History  Allergies, osteoarthritis, thrombocytopenia, diabetes, hepatitis B carrier, hypertension. History of vasectomy. Surgical History  Nasal polyps removal.    Social History  Smoking Status Former smoker, stopped smokin  He drinks 2 to 3 glasses of wine daily since he is retired in 2017. He denies any illicit drug use. He has 1 daughter and 1 grandchild. Family History  Mother had a plastic anemia/leukemia. Nephew had hemophilia. Review of Systems: \"Per interval history; otherwise 10 point ROS is negative. \"     Vital Signs:  /77 (Site: Right Upper Arm, Position: Sitting, Cuff Size: Medium Adult)   Pulse 65   Temp 98.1 °F (36.7 °C) (Infrared)   Ht 5' 5\" (1.651 m)   Wt 166 lb 12.8 oz (75.7 kg)   SpO2 95%   BMI 27.76 kg/m²     Physical Exam:  CONSTITUTIONAL: awake, alert, cooperative, no apparent distress   EYES: pupils equal, round and reactive to light, sclera clear and conjunctiva normal  ENT: Normocephalic, without obvious abnormality, atraumatic  NECK: supple, symmetrical, no jugular venous distension and no carotid bruits   HEMATOLOGIC/LYMPHATIC: no cervical, supraclavicular or axillary lymphadenopathy   LUNGS: no increased work of breathing and clear to auscultation   CARDIOVASCULAR: regular rate and rhythm, normal S1 and S2, no murmur noted  ABDOMEN: normal bowel sounds x 4, soft, non-distended, non-tender, no masses palpated, no hepatosplenomegaly   MUSCULOSKELETAL: full range of motion noted, tone is normal  NEUROLOGIC: awake, alert, oriented to name, place and time. Cranial nerves II through XII grossly intact. SKIN: Normal skin color, texture, turgor and no jaundice. appears intact   EXTREMITIES: + LE edema. No cyanosis. Labs:  Hematology:  Lab Results   Component Value Date    WBC 6.0 06/21/2021    RBC 4.65 06/21/2021    HGB 15.4 06/21/2021    HCT 43.0 06/21/2021    MCV 92.5 06/21/2021    MCH 33.1 (H) 06/21/2021    MCHC 35.8 06/21/2021    RDW 13.1 06/21/2021    PLT 94 (L) 06/21/2021    MPV 10.2 06/21/2021    SEGSPCT 60.4 06/21/2021    EOSRELPCT 2.5 06/21/2021    BASOPCT 0.5 06/21/2021    LYMPHOPCT 27.3 06/21/2021    MONOPCT 9.3 (H) 06/21/2021    SEGSABS 3.6 06/21/2021    EOSABS 0.2 06/21/2021    BASOSABS 0.0 06/21/2021    LYMPHSABS 1.6 06/21/2021    MONOSABS 0.6 06/21/2021    DIFFTYPE AUTOMATED DIFFERENTIAL 06/21/2021     Chemistry:  Lab Results   Component Value Date     04/23/2021    K 4.0 04/23/2021    CL 99 04/23/2021    CO2 27 04/23/2021    BUN 8 04/23/2021    CREATININE 0.8 (L) 04/23/2021    GLUCOSE 143 (H) 04/23/2021    CALCIUM 9.4 04/23/2021    PROT 7.2 04/23/2021    LABALBU 4.3 04/23/2021    BILITOT 1.2 (H) 04/23/2021    ALKPHOS 103 04/23/2021    AST 28 04/23/2021    ALT 22 04/23/2021    LABGLOM >60 04/23/2021    GFRAA >60 04/23/2021     Lab Results   Component Value Date     06/26/2019     Immunology:  Lab Results   Component Value Date    PROT 7.2 04/23/2021      Observations:  No data recorded      Assessment & Plan:    1. He was referred for evaluation of thrombocytopenia. This could be related to autoimmune mediated process. I mentioned alcohol may contribute to thrombocytopenia. He is cutting down alcohol consumption. Ultrasound of liver and spleen in July 2019 showed normal liver and spleen with calcified slenic granuloma. CBC in July 2019 showed plaltelet  ~90, LDH was normal. HIV screening, SOCORRO, rheumatoid factor were negative. CBC in December 2019 was reviewed. Labs in June 2020 was reviewed. CBC in March 2021 was stable. CBC on June 21, 2021 showed platelet count of 94. WBC was 6.0 and hemoglobin 15.4. I may consider bone marrow study if platelet continues to drop. 2.  He has episodes of hematuria with dysuria. He was found to have 2 bladder masses. 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 He will have follow-up cystoscopy in 3 months. I strongly recommend he follow-up with urologist.    We discussed about healthy diet and lifestyle. Return to clinic in 6 months or sooner. All of his questions have been answered for today. Recent imaging and labs were reviewed and discussed with the patient.       Electronically signed by Guanakito Chowdhury MD on 12/17/20 at 9:37 AM EST

## 2021-06-21 ENCOUNTER — HOSPITAL ENCOUNTER (OUTPATIENT)
Dept: INFUSION THERAPY | Age: 76
Discharge: HOME | End: 2021-06-21
Payer: MEDICARE

## 2021-06-21 ENCOUNTER — OFFICE VISIT (OUTPATIENT)
Dept: ONCOLOGY | Age: 76
End: 2021-06-21
Payer: MEDICARE

## 2021-06-21 VITALS
OXYGEN SATURATION: 95 % | DIASTOLIC BLOOD PRESSURE: 77 MMHG | BODY MASS INDEX: 27.79 KG/M2 | TEMPERATURE: 98.1 F | HEIGHT: 65 IN | HEART RATE: 65 BPM | WEIGHT: 166.8 LBS | SYSTOLIC BLOOD PRESSURE: 139 MMHG

## 2021-06-21 DIAGNOSIS — D69.6 THROMBOCYTOPENIA, UNSPECIFIED (HCC): ICD-10-CM

## 2021-06-21 DIAGNOSIS — D69.6 THROMBOCYTOPENIA, UNSPECIFIED (HCC): Primary | ICD-10-CM

## 2021-06-21 LAB
ALBUMIN SERPL-MCNC: 4 GM/DL (ref 3.4–5)
ALP BLD-CCNC: 114 IU/L (ref 40–128)
ALT SERPL-CCNC: 32 U/L (ref 10–40)
ANION GAP SERPL CALCULATED.3IONS-SCNC: 11 MMOL/L (ref 4–16)
AST SERPL-CCNC: 35 IU/L (ref 15–37)
BASOPHILS ABSOLUTE: 0 K/CU MM
BASOPHILS RELATIVE PERCENT: 0.5 % (ref 0–1)
BILIRUB SERPL-MCNC: 0.9 MG/DL (ref 0–1)
BUN BLDV-MCNC: 9 MG/DL (ref 6–23)
CALCIUM SERPL-MCNC: 9.5 MG/DL (ref 8.3–10.6)
CHLORIDE BLD-SCNC: 103 MMOL/L (ref 99–110)
CO2: 24 MMOL/L (ref 21–32)
CREAT SERPL-MCNC: 0.6 MG/DL (ref 0.9–1.3)
DIFFERENTIAL TYPE: ABNORMAL
EOSINOPHILS ABSOLUTE: 0.2 K/CU MM
EOSINOPHILS RELATIVE PERCENT: 2.5 % (ref 0–3)
GFR AFRICAN AMERICAN: >60 ML/MIN/1.73M2
GFR NON-AFRICAN AMERICAN: >60 ML/MIN/1.73M2
GLUCOSE BLD-MCNC: 174 MG/DL (ref 70–99)
HCT VFR BLD CALC: 43 % (ref 42–52)
HEMOGLOBIN: 15.4 GM/DL (ref 13.5–18)
LYMPHOCYTES ABSOLUTE: 1.6 K/CU MM
LYMPHOCYTES RELATIVE PERCENT: 27.3 % (ref 24–44)
MCH RBC QN AUTO: 33.1 PG (ref 27–31)
MCHC RBC AUTO-ENTMCNC: 35.8 % (ref 32–36)
MCV RBC AUTO: 92.5 FL (ref 78–100)
MONOCYTES ABSOLUTE: 0.6 K/CU MM
MONOCYTES RELATIVE PERCENT: 9.3 % (ref 0–4)
PDW BLD-RTO: 13.1 % (ref 11.7–14.9)
PLATELET # BLD: 94 K/CU MM (ref 140–440)
PMV BLD AUTO: 10.2 FL (ref 7.5–11.1)
POTASSIUM SERPL-SCNC: 4.2 MMOL/L (ref 3.5–5.1)
RBC # BLD: 4.65 M/CU MM (ref 4.6–6.2)
SEGMENTED NEUTROPHILS ABSOLUTE COUNT: 3.6 K/CU MM
SEGMENTED NEUTROPHILS RELATIVE PERCENT: 60.4 % (ref 36–66)
SODIUM BLD-SCNC: 138 MMOL/L (ref 135–145)
TOTAL PROTEIN: 7 GM/DL (ref 6.4–8.2)
WBC # BLD: 6 K/CU MM (ref 4–10.5)

## 2021-06-21 PROCEDURE — 36415 COLL VENOUS BLD VENIPUNCTURE: CPT

## 2021-06-21 PROCEDURE — 99211 OFF/OP EST MAY X REQ PHY/QHP: CPT

## 2021-06-21 PROCEDURE — 99214 OFFICE O/P EST MOD 30 MIN: CPT | Performed by: INTERNAL MEDICINE

## 2021-06-21 PROCEDURE — 80053 COMPREHEN METABOLIC PANEL: CPT

## 2021-06-21 PROCEDURE — 85025 COMPLETE CBC W/AUTO DIFF WBC: CPT

## 2021-06-21 NOTE — PROGRESS NOTES
MA Rooming Questions  Patient: Diandra Allan  MRN: [id number]    Date: 6/21/2021        1. Do you have any new issues?   no         2. Do you need any refills on medications?    no    3. Have you had any imaging done since your last visit? yes - MRI, Ct scan    4. Have you been hospitalized or seen in the emergency room since your last visit here?   no    5. Did the patient have a depression screening completed today?  No    No data recorded     PHQ-9 Given to (if applicable):               PHQ-9 Score (if applicable):                     [] Positive     []  Negative              Does question #9 need addressed (if applicable)                     [] Yes    []  No               [facility]

## 2021-10-04 ENCOUNTER — OFFICE VISIT (OUTPATIENT)
Dept: NEUROLOGY | Age: 76
End: 2021-10-04
Payer: MEDICARE

## 2021-10-04 VITALS
SYSTOLIC BLOOD PRESSURE: 112 MMHG | HEART RATE: 80 BPM | WEIGHT: 161 LBS | HEIGHT: 65 IN | DIASTOLIC BLOOD PRESSURE: 72 MMHG | BODY MASS INDEX: 26.82 KG/M2 | OXYGEN SATURATION: 97 %

## 2021-10-04 DIAGNOSIS — C67.9 MALIGNANT NEOPLASM OF URINARY BLADDER, UNSPECIFIED SITE (HCC): ICD-10-CM

## 2021-10-04 DIAGNOSIS — Z85.51 HISTORY OF BLADDER CANCER: ICD-10-CM

## 2021-10-04 DIAGNOSIS — G25.0 ESSENTIAL TREMOR: Primary | ICD-10-CM

## 2021-10-04 DIAGNOSIS — R25.9 PARKINSONIAN FEATURES: ICD-10-CM

## 2021-10-04 PROCEDURE — 99205 OFFICE O/P NEW HI 60 MIN: CPT | Performed by: STUDENT IN AN ORGANIZED HEALTH CARE EDUCATION/TRAINING PROGRAM

## 2021-10-04 RX ORDER — PRIMIDONE 50 MG/1
TABLET ORAL
Qty: 90 TABLET | Refills: 3 | Status: SHIPPED | OUTPATIENT
Start: 2021-10-04 | End: 2021-12-21

## 2021-10-04 NOTE — PROGRESS NOTES
Neurological Services Consult Note  [facility]  Patient Name: Renan Jacques  : 1945      Subjective:  77 y.o. -handed male presenting to [address] [place] [address] to be seen in consultation for tremor. He tells me that he noted this tremor starting about 1 year ago. This has slowly worsened. He is most bothered by the fact that it worsens when he is doing something with his hands. He notes that when he is holding silverware or holding plates. This worsens when he is holding his steering wheel. He can hold his hands and it seems to calm down. His handwriting has gotten messier and perhaps a little bit smaller. His wife notes that he is always had short stride, but this has also seemed to worsen over the last year. He does have history of diabetes and has been diagnosed with peripheral neuropathy. He was previously on gabapentin at nighttime however this was stopped due to this causing worsening burning sensation in his feet. He does not remember if this was helpful for the tremor. He does have history of bladder cancer. He does follow with urology. His brother does not have this type of tremor, and he is unsure of other family history of tremor. His parents passed when he was quite young. No known history of essential tremor or Parkinson disease in his family. Past Medical History:   Diagnosis Date    Essential tremor 10/4/2021    History of bladder cancer 10/4/2021    :   Past Surgical History:   Procedure Laterality Date    NASAL POLYP SURGERY      Removal of nasal polyp     Current Outpatient Medications on File Prior to Visit   Medication Sig Dispense Refill    Alcohol Swabs (ALCOHOL PREP) 70 % PADS       ONETOUCH ULTRA strip       atorvastatin (LIPITOR) 10 MG tablet Take 10 mg by mouth daily      tamsulosin (FLOMAX) 0.4 MG capsule Take 0.4 mg by mouth daily       No current facility-administered medications on file prior to visit.      Scheduled meds:  Current Outpatient Medications   Medication Sig Dispense Refill    Alcohol Swabs (ALCOHOL PREP) 70 % PADS       ONETOUCH ULTRA strip       atorvastatin (LIPITOR) 10 MG tablet Take 10 mg by mouth daily      tamsulosin (FLOMAX) 0.4 MG capsule Take 0.4 mg by mouth daily       No current facility-administered medications for this visit. Allergies   Allergen Reactions    Sulfa Antibiotics        Social History     Socioeconomic History    Marital status:      Spouse name: Not on file    Number of children: Not on file    Years of education: Not on file    Highest education level: Not on file   Occupational History    Not on file   Tobacco Use    Smoking status: Former Smoker     Packs/day: 0.50     Years: 30.00     Pack years: 15.00     Types: Cigarettes     Start date:      Quit date:      Years since quittin.7    Smokeless tobacco: Never Used   Substance and Sexual Activity    Alcohol use: Not Currently    Drug use: Never    Sexual activity: Not on file   Other Topics Concern    Not on file   Social History Narrative    Not on file     Social Determinants of Health     Financial Resource Strain:     Difficulty of Paying Living Expenses:    Food Insecurity:     Worried About [address] in the Last Year:     [address] [place] in the Last Year:    Transportation Needs:     Lack of Transportation (Medical):      Lack of Transportation (Non-Medical):    Physical Activity:     Days of Exercise per Week:     Minutes of Exercise per Session:    Stress:     Feeling of Stress :    Social Connections:     Frequency of Communication with Friends and Family:     Frequency of Social Gatherings with Friends and Family:     Attends Worship Services:     Active Member of Clubs or Organizations:     Attends Club or Organization Meetings:     Marital Status:    Intimate Partner Violence:     Fear of Current or Ex-Partner:     Emotionally Abused:     Physically Abused:     Sexually Abused:       Family history: He tells me no known history of tremors in his family, does not know much of his family history as they passed when he was quite young. Review of Symptoms:  10-point system review completed. All of which are negative except as mentioned above. Vitals:    10/04/21 0959   BP: 112/72   Pulse: 80   SpO2: 97%       Exam: Gen: A&O x 4, NAD, cooperative  HEENT: NC/AT, EOMI, PERRL, mmm, neck supple, no meningeal signs  Heart: No central cyanosis or clubbing noted  Lungs: CTAB  Abd: soft/NT/ND  Ext: no edema, no calf tenderness b/l  Endo: no thyromegaly  Psych: no depression or anxiety history  Skin: no rashes or lesions    NEUROLOGIC EXAM:  Mental Status: A&O x 4, NAD, speech clear, language fluent, comprehension intact, repetition and naming intact    Cranial Nerve Exam:   CN II-XII intact: PERRL, VFF, no nystagmus, no gaze paresis, sensation V1-V3 intact b/l, muscles of facial expression symmetric; hearing intact to conversational tone, palate elevates symmetrically, shoulder elevation symmetric and tongue protrudes midline with movement side to side.     Motor Exam:       Strength 5/5 UE's/LE's b/l  bulk normal   Some ratcheting noted in the right greater than left upper extremity primarily at the elbows, however his tremor was certainly contributing to this as well  No pronator drift    Deep Tendon Reflexes: 2/4 biceps, triceps, brachioradialis, patellar, and achilles b/l, flexor plantar responses b/l    Sensation: Intact light touch/temperature UE's/LE's b/l; hyperesthesia noted to temperature in the distal lower extremities; mildly decreased vibratory sense in distal lower extremities    Coordination/Cerebellum:       Tremors--dusting and kinetic tremor noted, right hand tremor worsened with finger to finger testing although less dramatic change with hand to face testing; leg tremor seem to improve while walking, worsened while seated, although hand tremors had both a resting feature as well as a kinetic feature      Rapidly alternating movements: no dysdiadochokinesia b/l                Finger-to-Nose: no dysmetria b/l    Gait and stance:      Gait: No ataxia; short stride noted, not a true shuffling gait this time      Romberg: not present    LABS:No results for input(s): WBC, HEMOGLOBIN, NA, CL, CO2, BUN, CREATININE, GLUCOSE, ALBUMIN, INR, PTT, CPK, CKMB, ESR, AMMONIA, UA in the last 72 hours. Invalid input(s): HEMATOCRIT, PLATELETS, POTASSIUM, CA, PT, CK1, TROP, BNAP, B12, LIPID PANEL     IMAGING: No pertinent imaging available      Other Studies:   7/9/2021  TSH 3.95 (within normal limits)  A1c 6.4  Average glucose 123    ASSESSMENT/PLAN:  Pleasant 79-year-old male seen in consultation today for tremor, with features of both an essential tremor as well as parkinsonian tremor. He does appear to be bothered by the difficulty with utilizing the hand with movement, therefore we will initially start with treatment of an essential tremor and as needed transition to treat him for parkinsonian tremor. 1. Essential tremor  1. We will trial him on low-dose primidone, 50 mg nightly for 2 weeks, then 100 mg nightly for 2 weeks then 150 mg nightly for 2 weeks if he tolerates; potential side effects for discussed with him  2. Could consider weighted silverware in the future  3. Would like to obtain a brain MRI however he is claustrophobic, will start with a head CT to evaluate any potential underlying lesions in the basal ganglia  2. Parkinsonian features  1. And atypical resting tremor noted in all extremities at various times, tremor worsening in both hands with walking without any true decreased armswing, facies appear appropriate, possible cogwheeling in the right elbow although tremor is certainly contributing  2. We will consider trial of Sinemet 10-100mg or 25-100mg TID pending how he does with the primidone  3.  Discussed that exercise and overall movements is important for either type of tremor and for overall brain health  4. Would like to obtain a brain MRI as above, however we will start with head CT due to claustrophobia  3. Discussed pt care with patient and his wife who is with him today. Mr. Star Guerra will return in 3 months time and notify us of any concerns of medications in the meantime. Thank you for allowing us to participate in the care of your patient. If there are any questions regarding evaluation please feel free to contact us.      Electronically signed by: Janice Camilo DO, 10/4/2021 12:23 PM

## 2021-10-05 ENCOUNTER — TELEPHONE (OUTPATIENT)
Dept: NEUROLOGY | Age: 76
End: 2021-10-05

## 2021-10-05 DIAGNOSIS — G20 PARKINSONIAN TREMOR (HCC): ICD-10-CM

## 2021-10-05 DIAGNOSIS — G25.0 ESSENTIAL TREMOR: Primary | ICD-10-CM

## 2021-10-05 NOTE — TELEPHONE ENCOUNTER
[address] sent a fax back saying they spoke to Abdiaziz's wife to try and schedule the CT Head w/o. Abdiaziz's wife stated he would like to do the MRI instead of the CT now after thinking about it some more. If you are okay with that can you place a new order for MRI Head?

## 2021-10-27 ENCOUNTER — TELEPHONE (OUTPATIENT)
Dept: NEUROLOGY | Age: 76
End: 2021-10-27

## 2021-10-27 NOTE — TELEPHONE ENCOUNTER
Received a fax from [place] stating insurance has denied the MRI. Please advise on what to do next.

## 2021-10-28 ENCOUNTER — TELEPHONE (OUTPATIENT)
Dept: NEUROLOGY | Age: 76
End: 2021-10-28

## 2021-10-28 DIAGNOSIS — G20 PARKINSONIAN TREMOR (HCC): Primary | ICD-10-CM

## 2021-10-28 NOTE — TELEPHONE ENCOUNTER
Patients wife called and stated that patient has not noticed a difference when taking the primidone and would like to d/c the medication.  Please advise

## 2021-11-01 NOTE — TELEPHONE ENCOUNTER
Per Dr. Kaylee Nichole she would like to discontinue CT Head and file an appeal or re submit PA for MRI. She states an MRI will evaluate more accurately any potential underlying structural lesions given parkinsonian tremor.

## 2021-11-01 NOTE — TELEPHONE ENCOUNTER
With the primidone, what dose is he at? Did he make it to 150mg? If so, he can take 100mg nightly for 10 days, then 50mg nightly for 10 days, then off    Once he is completely off the Primidone (in roughly 3 weeks), we will start low dose Sinemet.  TID. I want them to document how he does with this, if it makes any difference. Most common side effects are GI effects at low doses; higher doses can cause abnrormal movements, hallucinations.

## 2021-11-01 NOTE — TELEPHONE ENCOUNTER
I called [facility] and spoke to Milady Perrin 157 She states she will call and see if she can withdraw the CT auth and/or see if we can do a [id number] to get the MRI approved. She will call back and let me know.

## 2021-11-02 ENCOUNTER — TELEPHONE (OUTPATIENT)
Dept: NEUROLOGY | Age: 76
End: 2021-11-02

## 2021-11-02 NOTE — TELEPHONE ENCOUNTER
Patient will be d/c Primidone w/ taper to be completed 11/16/2021. At this time the patient and wife want to wait for the results of the upcoming MRI to result and due to the patients brother being on hospice in [place], [place] the patient does not want to start a new medication until he can be more local to the [facility] area. Patient scheduled for F/U in Jan w/ M. Darnella Curling. Patient instructed to call with changes in symptoms or if condition worsens.

## 2021-11-02 NOTE — TELEPHONE ENCOUNTER
Left voice mail for patient to call office back to discuss test results. HIPPA form declined test results over voicemail.

## 2021-11-03 NOTE — TELEPHONE ENCOUNTER
Francia Yan was not able to widthdraw the CT request. She gave me a number to call for reconsideration per message taken and given to me.

## 2021-11-03 NOTE — TELEPHONE ENCOUNTER
I called [facility] and was transferred multiple times to different departments. I finally spoke to a representative on the [phone] number who was able to help start the appeal process for the MRI. She states we need to send all clinical notes to them within 24 hours and that they will make a decision in 72 hours.  The reason     Y:[phone]

## 2021-11-05 NOTE — TELEPHONE ENCOUNTER
Patient is weaning down primidone currently. Due to patient being out of town while his brother is on hospice, patient is deffering new medication until he returns to the local area and the MRI is done and results.

## 2021-11-05 NOTE — TELEPHONE ENCOUNTER
Spoke to Jesse Bustamante and let her know I received the appeal approval. I told her Monday morning I will get the order sent to [facility] and they will call to get him set up.

## 2021-11-05 NOTE — TELEPHONE ENCOUNTER
Chief Complaint   Patient presents with   • Prenatal Care     nausea/fatigue/tender breast/SOB       History of Present Illness:  Vanita is a 43 year old female,       is seen today for confirmation of pregnancy. She did get an ultrasound that showed a viable IUP at 7 weeks. States that it differed by one day from her LMP. She has noticed spotting after they have had sex. Denies any other problems. We discussed AMA status and risk of aneuploidy or complications. She does have a history of  delivery with her first pregnancy. She was induced at 36 weeks for preeclampsia with her second pregnancy. Her third pregnancy she was placed on bedrest at home due to  contractions.     Patient's last menstrual period was 2017 (exact date).  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*                 Comment: x8    Abnormal Pap smear                                            Lupus                                                         IBS (irritable bowel syndrome)                                Asthma flare                                                  Hypothyroidism                                                Depression                                                  Past Surgical History:   Procedure Laterality Date   • BREAST ENHANCEMENT SURGERY     • COLPOSCOPY,LOOP ELECTRD CERVIX EXCIS     • DILATION AND CURETTAGE OF UTERUS      1   • HYSTEROSCOPY     • LYMPH NODE BIOPSY     • REMOVAL OF TONSILS,13+ Y/O       Current Outpatient Prescriptions   Medication Sig Dispense Refill   • folic acid (FOLATE) 400 MCG tablet Take 400 mcg by mouth daily.     • Prenatal MV-Min-Fe Fum-FA-DHA (PRENATAL 1 PO)      • Ferrous Sulfate (IRON) 325 (65 Fe) MG Tab Take 1 tablet by mouth every other day.     • Fiber, Guar Gum, Chew Tab Chew 1 tablet by mouth daily.     • fluticasone (FLONASE) 50 MCG/ACT nasal spray Spray 1 spray in each nostril daily. 16 g 1     No current  This is ok. Understandable he does not want to make too many changes at one time give his brother's situation. My condolences to him. facility-administered medications for this visit.      ALLERGIES:   Allergen Reactions   • Amoxicillin HIVES     asthma   • Codeine HIVES   • Latex HIVES     itchy     Social History     Social History   • Marital status:      Spouse name: angel   • Number of children: 3   • Years of education: N/A     Occupational History   •       self empolyed     Social History Main Topics   • Smoking status: Never Smoker   • Smokeless tobacco: Never Used   • Alcohol use 0.6 oz/week     1 Standard drinks or equivalent per week      Comment: social/none since pregnancy   • Drug use: No   • Sexual activity: Yes     Partners: Male     Other Topics Concern   •  Service Yes     [employer]   • Blood Transfusions No   • Caffeine Concern No     2 per wk   • Occupational Exposure No   • Hobby Hazards No   • Sleep Concern Yes     7-8   • Stress Concern Yes     pregnacy issues, job related jan- april   • Weight Concern Yes     cant lose   • Special Diet No   • Back Care Yes     bulgin disc l5-s1   • Exercise Yes     3 cardio and strength training   • Bike Helmet Yes   • Seat Belt Yes   • Self-Exams Yes     Social History Narrative         REVIEW OF SYSTEMS:    Negative for any significant problems with the following:    General: unexplained fevers or chills  Cardiovascular: chest pain, palpitations or edema  Respiratory: shortness of breath  Breasts: dominant masses or nipple discharge  GI: nausea/vomiting, diarrhea/constipation  Urinary: dysuria/hematuria  Reproductive: vaginal discharge, burning, odor or itching  Skin: new or changing nevi  Neuro: headaches  Musculoskeletal: new aches or pains      PHYSICAL EXAM:  Blood pressure 130/72, height 5' 5\" (1.651 m), weight 82.3 kg, last menstrual period 02/28/2017. Body mass index is 30.19 kg/(m^2).  General: well developed, well nourished, in no acute distress  Psych: alert and cooperative; normal mood and affect    Pelvic Exam:    External genitalia normal, no  lesions. Speculum inserted and cervix visualized. Pap smear was performed.     Transvaginal ultrasound performed. Single intrauterine pregnancy visualized. Size was measuring less than dates with crown rump length of 8 weeks 4 days. No fetal movement identified. Fetal pole was very dark and hard to visualize. No fetal heart beat could be identified.     ASSESSMENT:    42 y/o female presenting for confirmation of pregnancy, likely missed     PLAN:      Discussed that I strongly suspect a miscarriage. I am not willing to confirm solely on bedside office ultrasound. Will send for ultrasound through radiology and await results.    Follow up as needed.

## 2021-11-21 NOTE — PROGRESS NOTES
Patient Name: Otis Lopes  Patient : 1945  Patient MRN: [id number]     Primary Oncologist: Jerry Shell MD  Referring Provider: Jayda Good MD     Date of Service: 2021      Chief Complaint:   Chief Complaint   Patient presents with    Follow-up     He came in for follow-up visit. Patient Active Problem List:     Other specified diseases of blood and blood-forming organs     Thrombocytopenia, unspecified     HPI:   Chelsey Gallegos is a pleasant 78-year-old male patient who was referred for evaluation of thrombocytopenia. CMP in May 2019 was unremarkable. WBC was 3.9, hemoglobin 14.7, hematocrit 42.1, .1, platelets 90. PSA was 1.99. Hepatitis C antibody was negative. On 2019 WBC was 5.2, hemoglobin 14.6, hematocrit 40.9, platelets 81. He drinks 2 to 3 glasses of wine daily since he is retired in 2017. At present time he is cutting down. He was told that he had hepatitis B infection in the past. I reviewed his medication list.  We discussed about potential etiologies of macrocytosis and thrombocytopenia, including alcohol use, liver disease, chronic infection, bone marrow disease and/or autoimmune disorder. At one time he was told to have an enlarged liver. He lost weight intentionally. US of abdomen in 2019 was negative for hepatosplenomegaly. HIV screening was negative. Platelet was 90. SOCORRO and RF were negative. CBC in 2019 showed slight decrease of platelet count. I may consider bone marrow study if platelet continues to drop. CBC in 2019 was stable. I advise to limit his wine intake to one glass a day. He wants to defer bone marrow study    Labs in 2020 was reviewed. We discussed about diet and positive attitude. He lost 30 lbs intentionally and felt better. Due to low HbA1c at 5.8, PCP discontinued metformin. On 2021 WBC was 4.9, hemoglobin 16, platelet 82. He is on Flomax.   He denied taking any NSAIDs or blood thinners. He was seen by Dr. Willey Curling who planned to do resection of the bladder masses. 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 She has cystoscopy/TURBT on 2021. 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 He is scheduled for follow-up cystoscopy in 3 months. Hematuria has resolved. On 2021 he came in for follow up visit. CBC in 2021 was unremarkable. Reportedly cystoscopy in 2021 showed small local recurrence of the bladder cancer. This was burned. Follow-up cystoscopy in 2022 was recommended. He complained of itching on inner thighs likely related to dry skin. I recommend to use and body lotion after shower. He has tremors. On 2021 WBC was 5.2, hemoglobin 15.7, platelet 81. Mean platelet volume was 45.6. No acute pain. Denied any nausea, vomiting or diarrhea. No fever or chills. No chest pain, shortness of breath or palpitation. No headache or dizzy spell. No specific bone pain. No melena or hematochezia. Denied any dysuria or hematuria. Past Medical History  Allergies, osteoarthritis, thrombocytopenia, diabetes, hepatitis B carrier, hypertension. History of vasectomy. Surgical History  Nasal polyps removal.    Social History  Smoking Status Former smoker, stopped smokin  He drinks 2 to 3 glasses of wine daily since he is retired in 2017. He denies any illicit drug use. He has 1 daughter and 1 grandchild. Family History  Mother had aplastic anemia/leukemia. Nephew had hemophilia. Review of Systems: \"Per interval history; otherwise 10 point ROS is negative. \"     Vital Signs:  BP (!) 148/78 (Site: Right Upper Arm, Position: Sitting, Cuff Size: Medium Adult)   Pulse 73   Temp 97.8 °F (36.6 °C) (Infrared)   Resp 16 Ht 5' 5\" (1.651 m)   Wt 161 lb (73 kg)   SpO2 97%   BMI 26.79 kg/m²     Physical Exam:  CONSTITUTIONAL: awake, alert, cooperative, no apparent distress   EYES: pupils equal, round and reactive to light, sclera clear and conjunctiva normal  ENT: Normocephalic, without obvious abnormality, atraumatic  NECK: supple, symmetrical, no jugular venous distension and no carotid bruits   HEMATOLOGIC/LYMPHATIC: no cervical, supraclavicular or axillary lymphadenopathy   LUNGS: no increased work of breathing and clear to auscultation   CARDIOVASCULAR: regular rate and rhythm, normal S1 and S2, no murmur  ABDOMEN: normal bowel sound, soft, non-distended, non-tender, no masses palpated, no hepatosplenomegaly   MUSCULOSKELETAL: full range of motion noted, tone is normal  NEUROLOGIC: awake, alert, oriented to name, place and time. Motor is grossly intact. Cranial nerves II through XII grossly intact. SKIN: Normal skin color, texture, turgor and no jaundice. appears intact   EXTREMITIES: + LE edema. No cyanosis.      Labs:  Hematology:  Lab Results   Component Value Date    WBC 6.0 06/21/2021    RBC 4.65 06/21/2021    HGB 15.4 06/21/2021    HCT 43.0 06/21/2021    MCV 92.5 06/21/2021    MCH 33.1 (H) 06/21/2021    MCHC 35.8 06/21/2021    RDW 13.1 06/21/2021    PLT 94 (L) 06/21/2021    MPV 10.2 06/21/2021    SEGSPCT 60.4 06/21/2021    EOSRELPCT 2.5 06/21/2021    BASOPCT 0.5 06/21/2021    LYMPHOPCT 27.3 06/21/2021    MONOPCT 9.3 (H) 06/21/2021    SEGSABS 3.6 06/21/2021    EOSABS 0.2 06/21/2021    BASOSABS 0.0 06/21/2021    LYMPHSABS 1.6 06/21/2021    MONOSABS 0.6 06/21/2021    DIFFTYPE AUTOMATED DIFFERENTIAL 06/21/2021     Chemistry:  Lab Results   Component Value Date     07/09/2021    K 3.9 07/09/2021     07/09/2021    CO2 26 07/09/2021    BUN 11 07/09/2021    CREATININE 0.8 07/09/2021    GLUCOSE 123 07/09/2021    CALCIUM 9.6 07/09/2021    PROT 7.0 06/21/2021    LABALBU 4.4 07/09/2021    BILITOT 0.8 07/09/2021 ALKPHOS 110 07/09/2021    AST 36 07/09/2021    ALT 32 07/09/2021    LABGLOM 88 07/09/2021    GFRAA >60 06/21/2021     Lab Results   Component Value Date     06/26/2019     Immunology:  Lab Results   Component Value Date    PROT 7.0 06/21/2021      Observations:  PHQ-9 Total Score: 1 (12/21/2021 11:30 AM)      Assessment & Plan:    1. He was referred for evaluation of thrombocytopenia. This could be related to autoimmune mediated process. I mentioned alcohol may contribute to thrombocytopenia. He is cutting back alcohol consumption. Ultrasound of liver and spleen in July 2019 showed normal liver and spleen with calcified slenic granuloma. CBC in July 2019 showed plaltelet  ~90, LDH was normal. HIV screening, SOCORRO, rheumatoid factor were negative. CBC in December 2019 was reviewed. Labs in June 2020 was reviewed. CBC in March 2021 was stable. CBC on June 21, 2021 showed platelet count of 94. WBC was 6.0 and hemoglobin 15.4. CBC in December 2021 was stable with platelet at 81. I may consider bone marrow study if platelet continues to drop. 2.  He has episodes of hematuria with dysuria. He was found to have 2 bladder masses. 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 Cystoscopy and ablation of the bladder cancer in September 2021. He will have follow-up cystoscopy in 3 months. I strongly recommend he follow-up with urologist.    We discussed about healthy diet and lifestyle. Return to clinic in 6 months or sooner. All of his questions have been answered for today. Recent imaging and labs were reviewed and discussed with the patient.

## 2021-12-21 ENCOUNTER — HOSPITAL ENCOUNTER (OUTPATIENT)
Dept: INFUSION THERAPY | Age: 76
Discharge: HOME | End: 2021-12-21
Payer: MEDICARE

## 2021-12-21 ENCOUNTER — OFFICE VISIT (OUTPATIENT)
Dept: ONCOLOGY | Age: 76
End: 2021-12-21
Payer: MEDICARE

## 2021-12-21 VITALS
RESPIRATION RATE: 16 BRPM | OXYGEN SATURATION: 97 % | DIASTOLIC BLOOD PRESSURE: 78 MMHG | WEIGHT: 161 LBS | TEMPERATURE: 97.8 F | HEART RATE: 73 BPM | SYSTOLIC BLOOD PRESSURE: 148 MMHG | BODY MASS INDEX: 26.82 KG/M2 | HEIGHT: 65 IN

## 2021-12-21 DIAGNOSIS — D69.6 THROMBOCYTOPENIA, UNSPECIFIED (HCC): Primary | ICD-10-CM

## 2021-12-21 DIAGNOSIS — D69.6 THROMBOCYTOPENIA, UNSPECIFIED (HCC): ICD-10-CM

## 2021-12-21 LAB
ALBUMIN SERPL-MCNC: 4.1 GM/DL (ref 3.4–5)
ALP BLD-CCNC: 120 IU/L (ref 40–129)
ALT SERPL-CCNC: 37 U/L (ref 10–40)
ANION GAP SERPL CALCULATED.3IONS-SCNC: 9 MMOL/L (ref 4–16)
AST SERPL-CCNC: 38 IU/L (ref 15–37)
BASOPHILS ABSOLUTE: 0 K/CU MM
BASOPHILS RELATIVE PERCENT: 0.6 % (ref 0–1)
BILIRUB SERPL-MCNC: 0.8 MG/DL (ref 0–1)
BUN BLDV-MCNC: 11 MG/DL (ref 6–23)
CALCIUM SERPL-MCNC: 9.3 MG/DL (ref 8.3–10.6)
CHLORIDE BLD-SCNC: 104 MMOL/L (ref 99–110)
CO2: 26 MMOL/L (ref 21–32)
CREAT SERPL-MCNC: 0.7 MG/DL (ref 0.9–1.3)
DIFFERENTIAL TYPE: ABNORMAL
EOSINOPHILS ABSOLUTE: 0.2 K/CU MM
EOSINOPHILS RELATIVE PERCENT: 3.1 % (ref 0–3)
GFR AFRICAN AMERICAN: >60 ML/MIN/1.73M2
GFR NON-AFRICAN AMERICAN: >60 ML/MIN/1.73M2
GLUCOSE BLD-MCNC: 260 MG/DL (ref 70–99)
HCT VFR BLD CALC: 43.4 % (ref 42–52)
HEMOGLOBIN: 15.7 GM/DL (ref 13.5–18)
LYMPHOCYTES ABSOLUTE: 1.4 K/CU MM
LYMPHOCYTES RELATIVE PERCENT: 26.5 % (ref 24–44)
MCH RBC QN AUTO: 32.4 PG (ref 27–31)
MCHC RBC AUTO-ENTMCNC: 36.2 % (ref 32–36)
MCV RBC AUTO: 89.5 FL (ref 78–100)
MONOCYTES ABSOLUTE: 0.4 K/CU MM
MONOCYTES RELATIVE PERCENT: 7.4 % (ref 0–4)
PDW BLD-RTO: 13.5 % (ref 11.7–14.9)
PLATELET # BLD: 81 K/CU MM (ref 140–440)
PMV BLD AUTO: 10.5 FL (ref 7.5–11.1)
POTASSIUM SERPL-SCNC: 4.1 MMOL/L (ref 3.5–5.1)
RBC # BLD: 4.85 M/CU MM (ref 4.6–6.2)
SEGMENTED NEUTROPHILS ABSOLUTE COUNT: 3.2 K/CU MM
SEGMENTED NEUTROPHILS RELATIVE PERCENT: 62.4 % (ref 36–66)
SODIUM BLD-SCNC: 139 MMOL/L (ref 135–145)
TOTAL PROTEIN: 6.8 GM/DL (ref 6.4–8.2)
WBC # BLD: 5.2 K/CU MM (ref 4–10.5)

## 2021-12-21 PROCEDURE — 80053 COMPREHEN METABOLIC PANEL: CPT

## 2021-12-21 PROCEDURE — 99211 OFF/OP EST MAY X REQ PHY/QHP: CPT

## 2021-12-21 PROCEDURE — 85025 COMPLETE CBC W/AUTO DIFF WBC: CPT

## 2021-12-21 PROCEDURE — 99213 OFFICE O/P EST LOW 20 MIN: CPT | Performed by: INTERNAL MEDICINE

## 2021-12-21 PROCEDURE — 36415 COLL VENOUS BLD VENIPUNCTURE: CPT

## 2021-12-21 ASSESSMENT — PATIENT HEALTH QUESTIONNAIRE - PHQ9
SUM OF ALL RESPONSES TO PHQ QUESTIONS 1-9: 1
2. FEELING DOWN, DEPRESSED OR HOPELESS: 1
1. LITTLE INTEREST OR PLEASURE IN DOING THINGS: 0
SUM OF ALL RESPONSES TO PHQ QUESTIONS 1-9: 1
SUM OF ALL RESPONSES TO PHQ QUESTIONS 1-9: 1
SUM OF ALL RESPONSES TO PHQ9 QUESTIONS 1 & 2: 1

## 2021-12-21 NOTE — PROGRESS NOTES
MA Rooming Questions  Patient: Jimmie White  MRN: [id number]    Date: 12/21/2021        1. Do you have any new issues? yes - Pt c/o itching on inner thighs. Pt's dentist noticed spot on his tongue that they want to monitor. 2. Do you need any refills on medications?    no    3. Have you had any imaging done since your last visit?   no    4. Have you been hospitalized or seen in the emergency room since your last visit here?   no    5. Did the patient have a depression screening completed today?  Yes    No data recorded     PHQ-9 Given to (if applicable):               PHQ-9 Score (if applicable):                     [] Positive     [x]  Negative              Does question #9 need addressed (if applicable)                     [] Yes    []  No               Benito Hernández MA

## 2022-01-26 ENCOUNTER — OFFICE VISIT (OUTPATIENT)
Dept: NEUROLOGY | Age: 77
End: 2022-01-26
Payer: MEDICARE

## 2022-01-26 VITALS — SYSTOLIC BLOOD PRESSURE: 130 MMHG | DIASTOLIC BLOOD PRESSURE: 80 MMHG | HEART RATE: 63 BPM | OXYGEN SATURATION: 99 %

## 2022-01-26 DIAGNOSIS — G20 PARKINSONISM, UNSPECIFIED PARKINSONISM TYPE (HCC): ICD-10-CM

## 2022-01-26 DIAGNOSIS — G25.0 ESSENTIAL TREMOR: Primary | ICD-10-CM

## 2022-01-26 PROBLEM — G20.C PARKINSONISM: Status: ACTIVE | Noted: 2022-01-26

## 2022-01-26 PROCEDURE — 99214 OFFICE O/P EST MOD 30 MIN: CPT | Performed by: NURSE PRACTITIONER

## 2022-01-26 NOTE — PROGRESS NOTES
1/26/22    Jon Tampa  1945    Chief Complaint   Patient presents with    Follow-up     Parkinson's. Stopped taking primidone after about 2 week beacuse he didnt notice a difference in it. History of Present Illness  Rocky Chung is a 77 y.o. male presenting today for follow-up of:  Tremor. On last visit a  MRI of his head was ordered to r/o possible lesion in basal ganglia, MRI showed no acute findings with mild chronic vascular changes and moderate sinus disease, he has followed with ENT in the past for polyps. He was started on Primidone but has since stopped taking after increasing to 1000 mg. Sinemet 25/100 TID was suggested and prior office visit note if primidone was not useful. Prior reported symptoms of bilateral upper extremity tremor worsen with activity, handwriting a bit smaller and messier, walking with short stride which has worsened over the past year. Rocky Chung does report having increased stress and anxiety r/t death of his brother and now impending death of his brother-in-law. Current Outpatient Medications   Medication Sig Dispense Refill    Alcohol Swabs (ALCOHOL PREP) 70 % PADS       ONETOUCH ULTRA strip       atorvastatin (LIPITOR) 10 MG tablet Take 10 mg by mouth daily      tamsulosin (FLOMAX) 0.4 MG capsule Take 0.4 mg by mouth daily       No current facility-administered medications for this visit. Physical Exam:  Also present during visit: spouse.     Mental Status   Orientation: oriented to person, oriented to place, oriented to problem and oriented to time    Mood/affectappropriate mood and appropriate affect   Memory/Other: recent memory intact, remote memory intact, fund of knowledge intact, attention span normal and concentration normal  Language  Language: (normal) language, no dysarthria, (normal) articulation and no dysphasia/aphasia  Cranial Nerves   Eyes: pupils normal size and reactive to light and visual fields appear full   CN III, IV, VI : extraocular muscle strength normal, normal pursuit, no nystagmus and no ptosis   Facial Motor: normal facial motor   CN XII: tongue protrudes midline  Motor/Coordination Exam   Power: motor strength appears intact throughout, no arm drift, normal tone and Some ratcheting noted in the right greater than left upper extremity primarily at the elbows, however his tremor was certainly contributing to this as well, Finding as well on 1/26/22   Coordination: normal finger-to-nose, forearm rotation intact and rapid alternating movement normal   Sensory Exam:  Intact light touch/temperature UE's/LE's b/l; hyperesthesia noted to temperature in the distal lower extremities; mildly decreased vibratory sense in distal lower extremities No Bradykinesia, No Dyskindesia, No myoclonus, Normal strength, Normal Tone Normal bulk and Normal tone      Tremors--Resting and kinetic tremor noted, right hand tremor worsened with finger to finger testing although less dramatic change with hand to face testing; leg tremor seem to improve while walking, worsened while seated, although hand tremors had both a resting feature as well as a kinetic feature      Rapidly alternating movements: no dysdiadochokinesia b/l                Finger-to-Nose: no dysmetria b/l      Gait: No ataxia; short stride noted, not a true shuffling gait this time, bilateral UE tremor with ambulation      Romberg: not present      /80 (Site: Right Upper Arm, Position: Sitting, Cuff Size: Medium Adult)   Pulse 63   SpO2 99%     Assessment and Plan     Diagnosis Orders   1. Essential tremor     2. Parkinsonism, unspecified Parkinsonism type ([facility] [address]       We we will trial Sinemet 25/100 mg titrating up to 3 doses per day to see if this helps with tremor. Mr. Joaquin Jo does take melatonin at night 10 mg, he will hold off on the melatonin and take 1 dose of Sinemet at bedtime for 1 week so that we can see how he tolerates this medication.   Once tolerated he will then initiate a morning dose as well and again and afternoon dose once tolerating twice daily dosing. I will plan on following up with MrFreddie Montelongo again in 3 months however he will keep me informed as to how he is doing on the Sinemet. Medications prescribed for the patient were discussed in detail. This included a discussion of the potential risks versus potential benefits of the medications. The patient was given time to ask questions and these were answered to the best of my ability. The patient appeared to understand the information provided. Return in about 3 months (around 4/26/2022).     CHELLY Vergara - TIGRE

## 2022-04-26 ENCOUNTER — OFFICE VISIT (OUTPATIENT)
Dept: NEUROLOGY | Age: 77
End: 2022-04-26
Payer: MEDICARE

## 2022-04-26 VITALS
DIASTOLIC BLOOD PRESSURE: 76 MMHG | OXYGEN SATURATION: 98 % | BODY MASS INDEX: 27.39 KG/M2 | WEIGHT: 164.4 LBS | HEIGHT: 65 IN | HEART RATE: 60 BPM | SYSTOLIC BLOOD PRESSURE: 120 MMHG

## 2022-04-26 DIAGNOSIS — G20 PARKINSONISM, UNSPECIFIED PARKINSONISM TYPE (HCC): ICD-10-CM

## 2022-04-26 DIAGNOSIS — G25.0 ESSENTIAL TREMOR: Primary | ICD-10-CM

## 2022-04-26 PROCEDURE — 99214 OFFICE O/P EST MOD 30 MIN: CPT | Performed by: NURSE PRACTITIONER

## 2022-04-26 NOTE — PROGRESS NOTES
4/26/22    Roxanne Rhea  1945    Chief Complaint   Patient presents with    Tremors     pt states he still has a tremor, but he is doing better especially in the evening, pt states in the mornings is when he \"shakes\" the most       History of Present Illness  Brenda Clancy is a 77 y.o. male presenting today for follow-up of:  Tremor. On last visit he was initiated on Sinemet 25/100 mg titrating up to 3 doses per day for management of his tremor. He does feel the sinemet is helpful with his tremor. He reports walking 2 miles daily and reports his tremor improves after his walk. He participates in [employer], similar to [employer] exercising to music at a facility three times per week. Abdiaziz does admit to having anxiety and mild depression. He does feel the anxiety began once COVID-19 began. He does try to stay very active and does appear to have a very good outlook with an overall positive personality. His wife is very involved with his care. He denies having any slowness with movement or stiffness. Current Outpatient Medications   Medication Sig Dispense Refill    carbidopa-levodopa (SINEMET)  MG per tablet TAKE ONE TABLET BY MOUTH THREE TIMES A DAY 90 tablet 3    Melatonin-Pyridoxine (MELATIN PO) Take 2 mg by mouth at bedtime      Alcohol Swabs (ALCOHOL PREP) 70 % PADS       ONETOUCH ULTRA strip       atorvastatin (LIPITOR) 10 MG tablet Take 10 mg by mouth daily      tamsulosin (FLOMAX) 0.4 MG capsule Take 0.4 mg by mouth daily       No current facility-administered medications for this visit. Physical Exam:  Also present during visit: spouse.       Mental Status              Orientation: oriented to person, oriented to place, oriented to problem and oriented to time                        Mood/affectappropriate mood and appropriate affect              Memory/Other: recent memory intact, remote memory intact, fund of knowledge intact, attention span normal and concentration normal  Language  Language: (normal) language, no dysarthria, (normal) articulation and no dysphasia/aphasia  Cranial Nerves              Eyes: pupils normal size and reactive to light and visual fields appear full              CN III, IV, VI : extraocular muscle strength normal, normal pursuit, no nystagmus and no ptosis              Facial Motor: normal facial motor              CN XII: tongue protrudes midline  Motor/Coordination Exam              Power: motor strength appears intact throughout, no arm drift, normal tone and Some ratcheting noted in the right greater than left upper extremity primarily at the elbows, however his tremor was certainly contributing to this as well, Finding as well on 1/26/22              Coordination: normal finger-to-nose, forearm rotation intact and rapid alternating movement normal              Sensory Exam:  Intact light touch/temperature UE's/LE's b/l; hyperesthesia noted to temperature in the distal lower extremities; mildly decreased vibratory sense in distal lower extremities No Bradykinesia, No Dyskindesia, No myoclonus, Normal strength, Normal Tone Normal bulk and Normal tone                  Tremors--Resting and kinetic tremor noted, right hand tremor worsened with finger to finger testing although less dramatic change with hand to face testing; leg tremor seem to improve while walking, worsened while seated, although hand tremors had both a resting feature as well as a kinetic feature, intermittent resting tremor bilateral lower extremity.       Rapidly alternating movements: no dysdiadochokinesia b/l                Finger-to-Nose: no dysmetria b/l      Gait: No ataxia; short stride noted, not a true shuffling gait this time, bilateral UE tremor with ambulation      Romberg: not present        /76 (Site: Left Upper Arm, Position: Sitting, Cuff Size: Large Adult)   Pulse 60   Ht 5' 5\" (1.651 m)   Wt 164 lb 6.4 oz (74.6 kg)   SpO2 98%   BMI 27.36 kg/m² Assessment and Plan     Diagnosis Orders   1. Essential tremor     2. Parkinsonism, unspecified Parkinsonism type ([address]     Daniella Vizcaino is doing extremely well on Sinemet, he feels pleased with results so we will not make any dosing changes at this time however I do feel he would benefit from a fourth dose per day but we will discuss this on next visit. He will continue doing his exercises, he is very active and I am very happy to see this. He is tolerating medication well with no unpleasant side effects. Currently does have an underlying component of anxiety that he fully admits to. We discussed the importance of finding stress relieving activities to try to manage his anxieties a little better which will definitely help with his tremor as well. I will plan on following up with Aleja Wiggins again in 3 months, sooner if the need arises. Medications prescribed for the patient were discussed in detail. This included a discussion of the potential risks versus potential benefits of the medications. The patient was given time to ask questions and these were answered to the best of my ability. The patient appeared to understand the information provided. Return in about 3 months (around 7/26/2022).     CHELLY Robbins - TIGRE

## 2022-07-03 NOTE — PROGRESS NOTES
Patient Name: Marilee Mcdonnell  Patient : 1945  Patient MRN: [id number]     Primary Oncologist: Saman Roy MD  Referring Provider: Smita Tobar MD     Date of Service: 2022      Chief Complaint:   Chief Complaint   Patient presents with    6 Month Follow-Up     He came in for follow-up visit. Patient Active Problem List:     Other specified diseases of blood and blood-forming organs     Thrombocytopenia, unspecified     HPI:   Lila Ryan is a pleasant 80-year-old male patient who was referred for evaluation of thrombocytopenia. CMP in May 2019 was unremarkable. WBC was 3.9, hemoglobin 14.7, hematocrit 42.1, .1, platelets 90. PSA was 1.99. Hepatitis C antibody was negative. On 2019 WBC was 5.2, hemoglobin 14.6, hematocrit 40.9, platelets 81. He drinks 2 to 3 glasses of wine daily since he is retired in 2017. At present time he is cutting down. He was told that he had hepatitis B infection in the past. I reviewed his medication list.  We discussed about potential etiologies of macrocytosis and thrombocytopenia, including alcohol use, liver disease, chronic infection, bone marrow disease and/or autoimmune disorder. At one time he was told to have an enlarged liver. He lost weight intentionally. US of abdomen in 2019 was negative for hepatosplenomegaly. HIV screening was negative. Platelet was 90. SOCORRO and RF were negative. CBC in 2019 showed slight decrease of platelet count. I may consider bone marrow study if platelet continues to drop. CBC in 2019 was stable. I advise to limit his wine intake to one glass a day. He wants to defer bone marrow study    Labs in 2020 was reviewed. We discussed about diet and positive attitude. He lost 30 lbs intentionally and felt better. Due to low HbA1c at 5.8, PCP discontinued metformin. On 2021 WBC was 4.9, hemoglobin 16, platelet 82. He is on Flomax.   He denied taking any NSAIDs or blood thinners. He was seen by Dr. Gregg Brasher who planned to do resection of the bladder masses. 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 She has cystoscopy/TURBT on 2021. 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 He is scheduled for follow-up cystoscopy in 3 months. Hematuria has resolved. CBC in 2021 was unremarkable. Reportedly cystoscopy in 2021 showed small local recurrence of the bladder cancer. This was burned. Follow-up cystoscopy in 2022 was recommended. He complained of itching on inner thighs likely related to dry skin. I recommend to use and body lotion after shower. He has tremors. On 2021 WBC was 5.2, hemoglobin 15.7, platelet 81. Mean platelet volume was 76.6. On 2022 he came in for follow up visit. CBC and CMP in 2021 were grossly stable for him. Reportedly he had cystoscopy in 2022 and was told that he was cancer free. He will have follow-up with urologist in 2022. Wife was concerned about potential hematuria. We will order urinalysis study. I will check CBC today and advised to call for the test result. He stopped drinking alcohol after 2021. He refused bone marrow study at present time. No acute pain. Denied any nausea, vomiting or diarrhea. No fever or chills. No chest pain, shortness of breath or palpitation. No headache or dizzy spell. No specific bone pain. No melena or hematochezia. Denied any dysuria or hematuria. Past Medical History  Allergies, osteoarthritis, thrombocytopenia, diabetes, hepatitis B carrier, hypertension. History of vasectomy.     Surgical History  Nasal polyps removal.    Social History  Smoking Status Former smoker, stopped smokin  He drinks 2 to 3 glasses of wine daily since he is retired in 2017. He denies any illicit drug use. He has 1 daughter and 1 grandchild. Family History  Mother had aplastic anemia/leukemia. Nephew had hemophilia. Review of Systems: \"Per interval history; otherwise 10 point ROS is negative. \"     Vital Signs:  BP (!) 149/81 (Site: Right Upper Arm, Position: Sitting, Cuff Size: Medium Adult)   Pulse 66   Temp 97.7 °F (36.5 °C) (Infrared)   Resp 16   Ht 5' 5\" (1.651 m)   Wt 165 lb 9.6 oz (75.1 kg)   SpO2 97%   BMI 27.56 kg/m²     Physical Exam:  CONSTITUTIONAL: awake, alert, cooperative, no apparent distress   EYES: pupils equal and round, sclera clear and conjunctiva normal  ENT: Normocephalic, without obvious abnormality, atraumatic  NECK: supple, symmetrical, no jugular venous distension and no carotid bruits   HEMATOLOGIC/LYMPHATIC: no cervical, supraclavicular or axillary lymphadenopathy   LUNGS: no increased work of breathing and clear to auscultation   CARDIOVASCULAR: regular rate and rhythm, normal S1 and S2, no murmur  ABDOMEN: normal bowel sound, soft, non-distended, non-tender, no palpable masses, no hepatosplenomegaly   MUSCULOSKELETAL: full range of motion noted, tone is normal  NEUROLOGIC: Motor skills grossly intact. CN II - XII grossly intact. SKIN: Normal skin color, texture, turgor and no jaundice. appears intact   EXTREMITIES: + LE edema. No cyanosis.      Labs:  Hematology:  Lab Results   Component Value Date    WBC 5.4 07/06/2022    RBC 4.87 07/06/2022    HGB 15.7 07/06/2022    HCT 44.6 07/06/2022    MCV 91.6 07/06/2022    MCH 32.2 (H) 07/06/2022    MCHC 35.2 07/06/2022    RDW 13.2 07/06/2022    PLT 74 (L) 07/06/2022    MPV 10.9 07/06/2022    SEGSPCT 61.1 07/06/2022    EOSRELPCT 2.6 07/06/2022    BASOPCT 0.4 07/06/2022    LYMPHOPCT 26.4 07/06/2022    MONOPCT 9.5 (H) 07/06/2022    SEGSABS 3.3 07/06/2022    EOSABS 0.1 07/06/2022    BASOSABS 0.0 07/06/2022    LYMPHSABS 1.4 07/06/2022    MONOSABS 0.5 07/06/2022    DIFFTYPE AUTOMATED DIFFERENTIAL 07/06/2022     Chemistry:  Lab Results   Component Value Date     12/21/2021    K 4.1 12/21/2021     12/21/2021    CO2 26 12/21/2021    BUN 11 12/21/2021    CREATININE 0.7 (L) 12/21/2021    GLUCOSE 260 (H) 12/21/2021    CALCIUM 9.3 12/21/2021    PROT 6.8 12/21/2021    LABALBU 4.1 12/21/2021    BILITOT 0.8 12/21/2021    ALKPHOS 120 12/21/2021    AST 38 (H) 12/21/2021    ALT 37 12/21/2021    LABGLOM >60 12/21/2021    GFRAA >60 12/21/2021     Lab Results   Component Value Date     06/26/2019     Immunology:  Lab Results   Component Value Date    PROT 6.8 12/21/2021      Observations:  PHQ-9 Total Score: 3 (7/6/2022 10:20 AM)  Thoughts that you would be better off dead, or of hurting yourself in some way: 0 (7/6/2022 10:20 AM)      Assessment & Plan:  1. He was referred for evaluation of thrombocytopenia. This could be related to autoimmune mediated process. I mentioned alcohol may contribute to thrombocytopenia. He is cutting back alcohol consumption. Ultrasound of liver and spleen in July 2019 showed normal liver and spleen with calcified slenic granuloma. CBC in July 2019 showed plaltelet  ~90, LDH was normal. HIV screening, SOCORRO, rheumatoid factor were negative. CBC in December 2019 was reviewed. Labs in June 2020 was reviewed. CBC in March 2021 was stable. CBC on June 21, 2021 showed platelet count of 94. WBC was 6.0 and hemoglobin 15.4. CBC in December 2021 was stable with platelet at 81. On July 16, 2022 WBC was 5.4, hemoglobin 15.7, platelet 74. He refused bone marrow study. We will continue to monitor CBC. 2.  He has episodes of hematuria with dysuria. He was found to have 2 bladder masses. 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 Cystoscopy and ablation of the bladder cancer in September 2021. Reportedly cystoscopy in March 2022 was clear.   He will follow-up with

## 2022-07-06 ENCOUNTER — OFFICE VISIT (OUTPATIENT)
Dept: ONCOLOGY | Age: 77
End: 2022-07-06
Payer: MEDICARE

## 2022-07-06 ENCOUNTER — HOSPITAL ENCOUNTER (OUTPATIENT)
Dept: INFUSION THERAPY | Age: 77
Discharge: HOME | End: 2022-07-06
Payer: MEDICARE

## 2022-07-06 VITALS
OXYGEN SATURATION: 97 % | HEART RATE: 66 BPM | RESPIRATION RATE: 16 BRPM | WEIGHT: 165.6 LBS | TEMPERATURE: 97.7 F | BODY MASS INDEX: 27.59 KG/M2 | HEIGHT: 65 IN | SYSTOLIC BLOOD PRESSURE: 149 MMHG | DIASTOLIC BLOOD PRESSURE: 81 MMHG

## 2022-07-06 DIAGNOSIS — D69.6 THROMBOCYTOPENIA, UNSPECIFIED (HCC): ICD-10-CM

## 2022-07-06 DIAGNOSIS — D69.6 THROMBOCYTOPENIA, UNSPECIFIED (HCC): Primary | ICD-10-CM

## 2022-07-06 LAB
ALBUMIN SERPL-MCNC: 4.2 GM/DL (ref 3.4–5)
ALP BLD-CCNC: 111 IU/L (ref 40–129)
ALT SERPL-CCNC: 21 U/L (ref 10–40)
ANION GAP SERPL CALCULATED.3IONS-SCNC: 9 MMOL/L (ref 4–16)
AST SERPL-CCNC: 33 IU/L (ref 15–37)
BACTERIA: NEGATIVE /HPF
BASOPHILS ABSOLUTE: 0 K/CU MM
BASOPHILS RELATIVE PERCENT: 0.4 % (ref 0–1)
BILIRUB SERPL-MCNC: 1 MG/DL (ref 0–1)
BILIRUBIN URINE: NEGATIVE MG/DL
BLOOD, URINE: ABNORMAL
BUN BLDV-MCNC: 17 MG/DL (ref 6–23)
CALCIUM SERPL-MCNC: 9.3 MG/DL (ref 8.3–10.6)
CHLORIDE BLD-SCNC: 107 MMOL/L (ref 99–110)
CLARITY: CLEAR
CO2: 24 MMOL/L (ref 21–32)
COLOR: YELLOW
CREAT SERPL-MCNC: 0.7 MG/DL (ref 0.9–1.3)
DIFFERENTIAL TYPE: ABNORMAL
EOSINOPHILS ABSOLUTE: 0.1 K/CU MM
EOSINOPHILS RELATIVE PERCENT: 2.6 % (ref 0–3)
GFR AFRICAN AMERICAN: >60 ML/MIN/1.73M2
GFR NON-AFRICAN AMERICAN: >60 ML/MIN/1.73M2
GLUCOSE BLD-MCNC: 181 MG/DL (ref 70–99)
GLUCOSE, URINE: >1000 MG/DL
HCT VFR BLD CALC: 44.6 % (ref 42–52)
HEMOGLOBIN: 15.7 GM/DL (ref 13.5–18)
KETONES, URINE: NEGATIVE MG/DL
LEUKOCYTE ESTERASE, URINE: NEGATIVE
LYMPHOCYTES ABSOLUTE: 1.4 K/CU MM
LYMPHOCYTES RELATIVE PERCENT: 26.4 % (ref 24–44)
MCH RBC QN AUTO: 32.2 PG (ref 27–31)
MCHC RBC AUTO-ENTMCNC: 35.2 % (ref 32–36)
MCV RBC AUTO: 91.6 FL (ref 78–100)
MONOCYTES ABSOLUTE: 0.5 K/CU MM
MONOCYTES RELATIVE PERCENT: 9.5 % (ref 0–4)
MUCUS: ABNORMAL HPF
NITRITE URINE, QUANTITATIVE: NEGATIVE
PDW BLD-RTO: 13.2 % (ref 11.7–14.9)
PH, URINE: 5.5 (ref 5–8)
PLATELET # BLD: 74 K/CU MM (ref 140–440)
PMV BLD AUTO: 10.9 FL (ref 7.5–11.1)
POTASSIUM SERPL-SCNC: 4.1 MMOL/L (ref 3.5–5.1)
PROTEIN UA: NEGATIVE MG/DL
RBC # BLD: 4.87 M/CU MM (ref 4.6–6.2)
RBC URINE: 1 /HPF (ref 0–3)
SEGMENTED NEUTROPHILS ABSOLUTE COUNT: 3.3 K/CU MM
SEGMENTED NEUTROPHILS RELATIVE PERCENT: 61.1 % (ref 36–66)
SODIUM BLD-SCNC: 140 MMOL/L (ref 135–145)
SPECIFIC GRAVITY UA: >1.03 (ref 1–1.03)
SQUAMOUS EPITHELIAL: <1 /HPF
TOTAL PROTEIN: 6.7 GM/DL (ref 6.4–8.2)
TRICHOMONAS: ABNORMAL /HPF
UROBILINOGEN, URINE: 0.2 MG/DL (ref 0.2–1)
WBC # BLD: 5.4 K/CU MM (ref 4–10.5)
WBC UA: 1 /HPF (ref 0–2)

## 2022-07-06 PROCEDURE — 81001 URINALYSIS AUTO W/SCOPE: CPT

## 2022-07-06 PROCEDURE — 36415 COLL VENOUS BLD VENIPUNCTURE: CPT

## 2022-07-06 PROCEDURE — 85025 COMPLETE CBC W/AUTO DIFF WBC: CPT

## 2022-07-06 PROCEDURE — 99213 OFFICE O/P EST LOW 20 MIN: CPT | Performed by: INTERNAL MEDICINE

## 2022-07-06 PROCEDURE — 1123F ACP DISCUSS/DSCN MKR DOCD: CPT | Performed by: INTERNAL MEDICINE

## 2022-07-06 PROCEDURE — 80053 COMPREHEN METABOLIC PANEL: CPT

## 2022-07-06 ASSESSMENT — PATIENT HEALTH QUESTIONNAIRE - PHQ9
3. TROUBLE FALLING OR STAYING ASLEEP: 1
SUM OF ALL RESPONSES TO PHQ QUESTIONS 1-9: 3
1. LITTLE INTEREST OR PLEASURE IN DOING THINGS: 0
9. THOUGHTS THAT YOU WOULD BE BETTER OFF DEAD, OR OF HURTING YOURSELF: 0
SUM OF ALL RESPONSES TO PHQ QUESTIONS 1-9: 3
SUM OF ALL RESPONSES TO PHQ QUESTIONS 1-9: 3
10. IF YOU CHECKED OFF ANY PROBLEMS, HOW DIFFICULT HAVE THESE PROBLEMS MADE IT FOR YOU TO DO YOUR WORK, TAKE CARE OF THINGS AT HOME, OR GET ALONG WITH OTHER PEOPLE: 1
SUM OF ALL RESPONSES TO PHQ QUESTIONS 1-9: 3
SUM OF ALL RESPONSES TO PHQ9 QUESTIONS 1 & 2: 0
8. MOVING OR SPEAKING SO SLOWLY THAT OTHER PEOPLE COULD HAVE NOTICED. OR THE OPPOSITE, BEING SO FIGETY OR RESTLESS THAT YOU HAVE BEEN MOVING AROUND A LOT MORE THAN USUAL: 0
7. TROUBLE CONCENTRATING ON THINGS, SUCH AS READING THE NEWSPAPER OR WATCHING TELEVISION: 0
2. FEELING DOWN, DEPRESSED OR HOPELESS: 0
5. POOR APPETITE OR OVEREATING: 1
6. FEELING BAD ABOUT YOURSELF - OR THAT YOU ARE A FAILURE OR HAVE LET YOURSELF OR YOUR FAMILY DOWN: 0
4. FEELING TIRED OR HAVING LITTLE ENERGY: 1

## 2022-07-06 NOTE — PROGRESS NOTES
MA Rooming Questions  Patient: Erica Edge  MRN: [id number]    Date: 7/6/2022        1. Do you have any new issues?   no         2. Do you need any refills on medications?    no    3. Have you had any imaging done since your last visit?   no    4. Have you been hospitalized or seen in the emergency room since your last visit here?   no    5. Did the patient have a depression screening completed today?  Yes    PHQ-9 Total Score: 3 (7/6/2022 10:20 AM)  Thoughts that you would be better off dead, or of hurting yourself in some way: 0 (7/6/2022 10:20 AM)       PHQ-9 Given to (if applicable):               PHQ-9 Score (if applicable):                     [] Positive     []  Negative              Does question #9 need addressed (if applicable)                     [] Yes    []  No               Austin Dave CMA

## 2022-07-07 ENCOUNTER — TELEPHONE (OUTPATIENT)
Dept: ONCOLOGY | Age: 77
End: 2022-07-07

## 2022-07-07 NOTE — TELEPHONE ENCOUNTER
This RN reviewed UA with Prateek Duenas [facility] for this RN to review with patient per Dr. Aye Tovar. Called patient, no answer.

## 2022-07-07 NOTE — TELEPHONE ENCOUNTER
Patient called for his results of the UA that was done yesterday and also was asking if Asper Cream will raise your platelet levels d/t him using this, please call

## 2022-08-04 ENCOUNTER — OFFICE VISIT (OUTPATIENT)
Dept: NEUROLOGY | Age: 77
End: 2022-08-04
Payer: MEDICARE

## 2022-08-04 VITALS
HEART RATE: 69 BPM | BODY MASS INDEX: 27.86 KG/M2 | OXYGEN SATURATION: 96 % | WEIGHT: 167.2 LBS | SYSTOLIC BLOOD PRESSURE: 126 MMHG | DIASTOLIC BLOOD PRESSURE: 80 MMHG | HEIGHT: 65 IN

## 2022-08-04 DIAGNOSIS — G25.0 ESSENTIAL TREMOR: Primary | ICD-10-CM

## 2022-08-04 DIAGNOSIS — G20 PARKINSONISM, UNSPECIFIED PARKINSONISM TYPE (HCC): ICD-10-CM

## 2022-08-04 DIAGNOSIS — Z85.51 HISTORY OF BLADDER CANCER: ICD-10-CM

## 2022-08-04 PROCEDURE — 1123F ACP DISCUSS/DSCN MKR DOCD: CPT | Performed by: NURSE PRACTITIONER

## 2022-08-04 PROCEDURE — 99214 OFFICE O/P EST MOD 30 MIN: CPT | Performed by: NURSE PRACTITIONER

## 2022-08-04 NOTE — PROGRESS NOTES
8/4/22    Darby Dyer  1945    Chief Complaint   Patient presents with    Tremors       History of Present Illness  Clemetine Claude is a 77 y.o. male presenting today for follow-up of: Tremor. Currently remains on Sinemet 25/100 mg 3 times daily for management of his tremor. On last visit we did discuss possibly increasing to 4 times daily for better management. He tells me he will often  forget to take a dose or take them late. He tries to take his doses at 9, 2P, 6P. He feels his symptoms are pretty-well managed and would like to stay at [facility]. He continues participating in [employer] three times per week. Abdiaziz does have history documented on consultation visit in October 2021 of atypical resting tremor in all extremities at various times, tremor worsened in both hands with walking, possible cogwheeling in right elbow. Current Outpatient Medications   Medication Sig Dispense Refill    carbidopa-levodopa (SINEMET)  MG per tablet TAKE ONE TABLET BY MOUTH THREE TIMES A DAY 90 tablet 3    Melatonin-Pyridoxine (MELATIN PO) Take 2 mg by mouth at bedtime (Patient not taking: Reported on 7/6/2022)      Alcohol Swabs (ALCOHOL PREP) 70 % PADS       ONETOUCH ULTRA strip       atorvastatin (LIPITOR) 10 MG tablet Take 10 mg by mouth daily       tamsulosin (FLOMAX) 0.4 MG capsule Take 0.4 mg by mouth daily       No current facility-administered medications for this visit.        Physical Exam:       Mental Status              Orientation: oriented to person, oriented to place, oriented to problem and oriented to time                        Mood/affectappropriate mood and appropriate affect              Memory/Other: recent memory intact, remote memory intact, fund of knowledge intact, attention span normal and concentration normal  Language  Language: (normal) language, no dysarthria, (normal) articulation and no dysphasia/aphasia  Cranial Nerves              Eyes: pupils normal size and reactive to light and visual fields appear full              CN III, IV, VI : extraocular muscle strength normal, normal pursuit, no nystagmus and no ptosis              Facial Motor: normal facial motor              CN XII: tongue protrudes midline  Motor/Coordination Exam              Power: motor strength appears intact throughout, no arm drift, normal tone               Coordination: normal finger-to-nose, forearm rotation intact and rapid alternating movement normal              Sensory Exam:  Intact light touch/temperature UE's/LE's b/l; hyperesthesia noted to temperature in the distal lower extremities; mildly decreased vibratory sense in distal lower extremities No Bradykinesia, No Dyskindesia, No myoclonus, Normal strength, Normal Tone Normal bulk and Normal tone                  Tremors--Resting and kinetic tremor noted, right hand tremor worsened with finger to finger testing although less dramatic change with hand to face testing; leg tremor seem to improve while walking, worsened while seated, although hand tremors had both a resting feature as well as a kinetic feature, intermittent resting tremor bilateral lower extremity. Rapidly alternating movements: no dysdiadochokinesia b/l                Finger-to-Nose: no dysmetria b/l      Gait: No ataxia; short stride noted, not a true shuffling gait this time, bilateral UE tremor with ambulation      Romberg: not present      /80 (Site: Left Upper Arm, Position: Sitting, Cuff Size: Large Adult)   Pulse 69   Ht 5' 5\" (1.651 m)   Wt 167 lb 3.2 oz (75.8 kg)   SpO2 96%   BMI 27.82 kg/m²     Assessment and Plan     Diagnosis Orders   1. Essential tremor        2. Parkinsonism, unspecified Parkinsonism type ([facility] [address]        3. History of bladder cancer          We will remain on Sinemet 1 pill 3 times daily. We discussed the importance of taking his pills on a routine basis and schedule. He is doing well, exam improved from last visit with no upper extremity rigidity.   He does continue to have both resting and essential tremor in upper and lower extremities at varying intervals. He is very happy with his management at this time. I will plan on following up with Abdiaziz again in 6 months, sooner if the need arises. He will continue participating in refit 3 times weekly. Medications prescribed for the patient were discussed in detail. This included a discussion of the potential risks versus potential benefits of the medications. The patient was given time to ask questions and these were answered to the best of my ability. The patient appeared to understand the information provided. Return in about 6 months (around 2/4/2023).     CHELLY Hull NP

## 2022-12-06 NOTE — PROGRESS NOTES
Patient Name: Linda Johnston  Patient : 1945  Patient MRN: [id number]     Primary Oncologist: Patrick Carrillo MD  Referring Provider: Daniel Oquendo MD     Date of Service: 2023      Chief Complaint:   Chief Complaint   Patient presents with    Follow-up     He came in for follow-up visit. Patient Active Problem List:     Other specified diseases of blood and blood-forming organs     Thrombocytopenia, unspecified     HPI:   Mayur Maxwell is a pleasant 60-year-old male patient who was referred for evaluation of thrombocytopenia. May 2019  CMP unremarkable. WBC 3.9, hemoglobin 14.7, hematocrit 42.1, .1, platelets 90. PSA was 1.99. Hepatitis C antibody negative. 2019 WBC 5.2, hemoglobin 14.6, hematocrit 40.9, platelets 81. He drinks 2 to 3 glasses of wine daily since he is retired in 2017. At present time he is cutting down. He was told that he had hepatitis B infection in the past. I reviewed his medication list.  We discussed about potential etiologies of macrocytosis and thrombocytopenia, including alcohol use, liver disease, chronic infection, bone marrow disease and/or autoimmune disorder. At one time he was told to have an enlarged liver. He lost weight intentionally. 2019 US of abdomen negative for hepatosplenomegaly. HIV screening was negative. Platelet was 90. SOCORRO and RFnegative. 2019 decreased platelet count. I may consider bone marrow study if platelet continues to drop. 2019 CBC stable. I advise to limit his wine intake to one glass a day. He wants to defer bone marrow study    Labs in 2020 was reviewed. He lost 30 lbs intentionally and felt better. Due to low HbA1c at 5.8, PCP discontinued metformin. 2021 WBC 4.9, hemoglobin 16, platelet 82. He is on Flomax. He denied taking any NSAIDs or blood thinners. He was seen by Dr. Maribel Hernandez who planned to do resection of the bladder masses.     Ultrasound of the kidney and bladder on April 5, 2021 showed 2 adjacent urinary bladder dependent left-sided mass highly concerning for urinary bladder neoplasm, heterogeneous prominent prostate gland. She has cystoscopy/TURBT on June 1, 2021. 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 He is scheduled for follow-up cystoscopy in 3 months. Hematuria has resolved. June 2021CBC stable for him. Reportedly cystoscopy in September 2021 showed small local recurrence of the bladder cancer. This was burned. Follow-up cystoscopy in March 2022 was recommended. He complained of itching on inner thighs likely related to dry skin. I recommend to use and body lotion after shower. He has tremors. December 21, 2021 WBC 5.2, hemoglobin 15.7, platelet 81. Mean platelet volume was 84.5. CBC and CMP in December 2021 grossly stable for him. Reportedly he had cystoscopy in March 2022 and was told that he was cancer free. He will have follow-up with urologist in September 2022. Wife was concerned about potential hematuria. We will order urinalysis study. I will check CBC today and advised to call for the test result. He stopped drinking alcohol after Christmas 2021. He refused bone marrow study at present time. 1/5/2023 he came in for follow up visit. 7/2022 CMP grossly unremarkable with random . WBC 5.4, Hg 15.7, plat 74. MPV 10.9. He has thin skin. He is agreeable to have CBC today. I advise to call for  result. No acute pain. Denied any nausea, vomiting or diarrhea. No fever or chills. No chest pain, shortness of breath or palpitation. No headache or dizzy spell. No specific bone pain. No melena or hematochezia. Denied any dysuria or hematuria. Past Medical History  Allergies, osteoarthritis, thrombocytopenia, diabetes, hepatitis B carrier, hypertension. History of vasectomy.     Surgical History  Nasal polyps removal.    Social History  Smoking Status Former smoker, stopped smokin  He drinks 2 to 3 glasses of wine daily since he is retired in 2017. He denies any illicit drug use. He has 1 daughter and 1 grandchild. Family History  Mother had aplastic anemia/leukemia. Nephew had hemophilia. Review of Systems: \"Per interval history; otherwise 10 point ROS is negative. \"     Vital Signs:  BP (!) 157/77 (Site: Right Upper Arm, Position: Sitting, Cuff Size: Medium Adult)   Pulse 83   Temp 98 °F (36.7 °C) (Temporal)   Ht 5' 5\" (1.651 m)   Wt 166 lb (75.3 kg)   SpO2 97%   BMI 27.62 kg/m²     Physical Exam:  CONSTITUTIONAL: awake, alert, cooperative, no apparent distress   EYES: pupils equal and round. Sclera clear and conjunctiva normal  ENT: Normocephalic, without obvious abnormality, atraumatic  NECK: supple, symmetrical, no jugular venous distension and no carotid bruits   HEMATOLOGIC/LYMPHATIC: no cervical, supraclavicular or axillary lymphadenopathy   LUNGS: no increased work of breathing and clear to auscultation   CARDIOVASCULAR: regular rate and rhythm, normal S1 and S2, no murmur  ABDOMEN: normal bowel sound, soft, non-distended, non-tender, no palpable masses, no hepatosplenomegaly   MUSCULOSKELETAL: full range of motion noted, tone is normal  NEUROLOGIC: Motor skills grossly intact. CN II - XII grossly intact. SKIN: No jaundice. Few small bruises. EXTREMITIES: + LE edema. No cyanosis.      Labs:  Hematology:  Lab Results   Component Value Date    WBC 5.4 2022    RBC 4.87 2022    HGB 15.7 2022    HCT 44.6 2022    MCV 91.6 2022    MCH 32.2 (H) 2022    MCHC 35.2 2022    RDW 13.2 2022    PLT 74 (L) 2022    MPV 10.9 2022    SEGSPCT 61.1 2022    EOSRELPCT 2.6 2022    BASOPCT 0.4 2022    LYMPHOPCT 26.4 2022    MONOPCT 9.5 (H) 2022    SEGSABS 3.3 2022    EOSABS 0.1 2022    BASOSABS 0.0 2022    LYMPHSABS 1.4 2022    MONOSABS 0.5 2022    DIFFTYPE AUTOMATED DIFFERENTIAL 07/06/2022     Chemistry:  Lab Results   Component Value Date     07/06/2022    K 4.1 07/06/2022     07/06/2022    CO2 24 07/06/2022    BUN 17 07/06/2022    CREATININE 0.7 (L) 07/06/2022    GLUCOSE 181 (H) 07/06/2022    CALCIUM 9.3 07/06/2022    PROT 6.7 07/06/2022    LABALBU 4.2 07/06/2022    BILITOT 1.0 07/06/2022    ALKPHOS 111 07/06/2022    AST 33 07/06/2022    ALT 21 07/06/2022    LABGLOM >60 07/06/2022    GFRAA >60 07/06/2022     Lab Results   Component Value Date     06/26/2019     Immunology:  Lab Results   Component Value Date    PROT 6.7 07/06/2022      Observations:  PHQ-9 Total Score: 0 (1/5/2023  9:26 AM)      Assessment & Plan:  1. He was referred for evaluation of thrombocytopenia. This could be related to autoimmune mediated process. I mentioned alcohol may contribute to thrombocytopenia. He is cutting back alcohol consumption. Ultrasound of liver and spleen in July 2019 showed normal liver and spleen with calcified slenic granuloma. CBC in July 2019 showed plaltelet  ~90, LDH was normal. HIV screening, SOCORRO, rheumatoid factor were negative. CBC in December 2019 was reviewed. Labs in June 2020 was reviewed. CBC in March 2021 was stable. CBC on June 21, 2021 showed platelet count of 94. WBC was 6.0 and hemoglobin 15.4. CBC in December 2021 was stable with platelet at 81. July 16, 2022 WBC 5.4, hemoglobin 15.7, platelet 74. He refused bone marrow study. I will check CBC  today and advise to call for result. We will continue to monitor CBC. 2.  He has episodes of hematuria with dysuria. He was found to have 2 bladder masses. 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 Cystoscopy and ablation of the bladder cancer in September 2021. Reportedly cystoscopy in March 2022 was clear. He follows with urologist every 6 months. He has appointment in March 2023.      We discussed about healthy diet and lifestyle. Return to clinic in 6 months or sooner. All of his questions have been answered for today. Recent imaging and labs were reviewed and discussed with the patient.

## 2023-01-04 ENCOUNTER — TELEPHONE (OUTPATIENT)
Dept: NEUROLOGY | Age: 78
End: 2023-01-04

## 2023-01-04 NOTE — TELEPHONE ENCOUNTER
Patient called stating his PCP wanted us to treat his CLARISA, but stated we do not treat that here at this office only his tremors. Explained to try asking the PCP for a referral to the sleep center or pulmonology. Patient stated understanding.

## 2023-01-05 ENCOUNTER — HOSPITAL ENCOUNTER (OUTPATIENT)
Dept: INFUSION THERAPY | Age: 78
Discharge: HOME | End: 2023-01-05
Payer: MEDICARE

## 2023-01-05 ENCOUNTER — OFFICE VISIT (OUTPATIENT)
Dept: ONCOLOGY | Age: 78
End: 2023-01-05
Payer: MEDICARE

## 2023-01-05 VITALS
HEIGHT: 65 IN | HEART RATE: 83 BPM | SYSTOLIC BLOOD PRESSURE: 157 MMHG | BODY MASS INDEX: 27.66 KG/M2 | WEIGHT: 166 LBS | DIASTOLIC BLOOD PRESSURE: 77 MMHG | OXYGEN SATURATION: 97 % | TEMPERATURE: 98 F

## 2023-01-05 DIAGNOSIS — D69.6 THROMBOCYTOPENIA, UNSPECIFIED (HCC): Primary | ICD-10-CM

## 2023-01-05 DIAGNOSIS — D69.6 THROMBOCYTOPENIA, UNSPECIFIED (HCC): ICD-10-CM

## 2023-01-05 LAB
ALBUMIN SERPL-MCNC: 4.2 GM/DL (ref 3.4–5)
ALP BLD-CCNC: 108 IU/L (ref 40–129)
ALT SERPL-CCNC: 9 U/L (ref 10–40)
ANION GAP SERPL CALCULATED.3IONS-SCNC: 9 MMOL/L (ref 4–16)
AST SERPL-CCNC: 27 IU/L (ref 15–37)
BASOPHILS ABSOLUTE: 0 K/CU MM
BASOPHILS RELATIVE PERCENT: 0.7 % (ref 0–1)
BILIRUB SERPL-MCNC: 0.9 MG/DL (ref 0–1)
BUN BLDV-MCNC: 10 MG/DL (ref 6–23)
CALCIUM SERPL-MCNC: 9.1 MG/DL (ref 8.3–10.6)
CHLORIDE BLD-SCNC: 102 MMOL/L (ref 99–110)
CO2: 27 MMOL/L (ref 21–32)
CREAT SERPL-MCNC: 0.8 MG/DL (ref 0.9–1.3)
DIFFERENTIAL TYPE: ABNORMAL
EOSINOPHILS ABSOLUTE: 0.2 K/CU MM
EOSINOPHILS RELATIVE PERCENT: 4.4 % (ref 0–3)
GFR SERPL CREATININE-BSD FRML MDRD: >60 ML/MIN/1.73M2
GLUCOSE BLD-MCNC: 183 MG/DL (ref 70–99)
HCT VFR BLD CALC: 46.5 % (ref 42–52)
HEMOGLOBIN: 16.2 GM/DL (ref 13.5–18)
LYMPHOCYTES ABSOLUTE: 1.5 K/CU MM
LYMPHOCYTES RELATIVE PERCENT: 26.6 % (ref 24–44)
MCH RBC QN AUTO: 32 PG (ref 27–31)
MCHC RBC AUTO-ENTMCNC: 34.8 % (ref 32–36)
MCV RBC AUTO: 91.7 FL (ref 78–100)
MONOCYTES ABSOLUTE: 0.5 K/CU MM
MONOCYTES RELATIVE PERCENT: 8.7 % (ref 0–4)
PDW BLD-RTO: 13 % (ref 11.7–14.9)
PLATELET # BLD: 71 K/CU MM (ref 140–440)
PMV BLD AUTO: 10.9 FL (ref 7.5–11.1)
POTASSIUM SERPL-SCNC: 3.8 MMOL/L (ref 3.5–5.1)
RBC # BLD: 5.07 M/CU MM (ref 4.6–6.2)
SEGMENTED NEUTROPHILS ABSOLUTE COUNT: 3.3 K/CU MM
SEGMENTED NEUTROPHILS RELATIVE PERCENT: 59.6 % (ref 36–66)
SODIUM BLD-SCNC: 138 MMOL/L (ref 135–145)
TOTAL PROTEIN: 7.2 GM/DL (ref 6.4–8.2)
WBC # BLD: 5.5 K/CU MM (ref 4–10.5)

## 2023-01-05 PROCEDURE — 1123F ACP DISCUSS/DSCN MKR DOCD: CPT | Performed by: INTERNAL MEDICINE

## 2023-01-05 PROCEDURE — 80053 COMPREHEN METABOLIC PANEL: CPT

## 2023-01-05 PROCEDURE — 36415 COLL VENOUS BLD VENIPUNCTURE: CPT

## 2023-01-05 PROCEDURE — 99211 OFF/OP EST MAY X REQ PHY/QHP: CPT

## 2023-01-05 PROCEDURE — 99213 OFFICE O/P EST LOW 20 MIN: CPT | Performed by: INTERNAL MEDICINE

## 2023-01-05 PROCEDURE — 85025 COMPLETE CBC W/AUTO DIFF WBC: CPT

## 2023-01-05 ASSESSMENT — PATIENT HEALTH QUESTIONNAIRE - PHQ9
1. LITTLE INTEREST OR PLEASURE IN DOING THINGS: 0
SUM OF ALL RESPONSES TO PHQ QUESTIONS 1-9: 0
SUM OF ALL RESPONSES TO PHQ9 QUESTIONS 1 & 2: 0
SUM OF ALL RESPONSES TO PHQ QUESTIONS 1-9: 0
2. FEELING DOWN, DEPRESSED OR HOPELESS: 0

## 2023-01-05 NOTE — PROGRESS NOTES
MA Rooming Questions  Patient: Kasi Connor  MRN: [id number]    Date: 1/5/2023        1. Do you have any new issues?   no         2. Do you need any refills on medications?    no    3. Have you had any imaging done since your last visit?   no    4. Have you been hospitalized or seen in the emergency room since your last visit here?   no    5. Did the patient have a depression screening completed today?  Yes    PHQ-9 Total Score: 0 (1/5/2023  9:26 AM)       PHQ-9 Given to (if applicable):               PHQ-9 Score (if applicable):                     [] Positive     []  Negative              Does question #9 need addressed (if applicable)                     [] Yes    []  No               Eugenia Cohen CMA

## 2023-02-27 NOTE — TELEPHONE ENCOUNTER
Patient is due for a follow up soon, patient is also due for refill.        Requested Prescriptions     Pending Prescriptions Disp Refills    carbidopa-levodopa (SINEMET)  MG per tablet [Pharmacy Med Name: CARBIDOPA-LEVODOPA  TAB] 90 tablet 5     Sig: TAKE ONE TABLET BY MOUTH THREE TIMES A DAY

## 2023-06-21 ENCOUNTER — PROCEDURE VISIT (OUTPATIENT)
Dept: CARDIOLOGY CLINIC | Age: 78
End: 2023-06-21
Payer: MEDICARE

## 2023-06-21 DIAGNOSIS — I38 VHD (VALVULAR HEART DISEASE): Primary | ICD-10-CM

## 2023-06-21 LAB
LV EF: 53 %
LVEF MODALITY: NORMAL

## 2023-06-21 PROCEDURE — 93306 TTE W/DOPPLER COMPLETE: CPT | Performed by: INTERNAL MEDICINE

## 2023-07-11 ENCOUNTER — OFFICE VISIT (OUTPATIENT)
Dept: ONCOLOGY | Age: 78
End: 2023-07-11
Payer: MEDICARE

## 2023-07-11 ENCOUNTER — HOSPITAL ENCOUNTER (OUTPATIENT)
Dept: INFUSION THERAPY | Age: 78
Discharge: HOME | End: 2023-07-11
Payer: MEDICARE

## 2023-07-11 VITALS
DIASTOLIC BLOOD PRESSURE: 86 MMHG | TEMPERATURE: 97.5 F | HEIGHT: 65 IN | OXYGEN SATURATION: 96 % | HEART RATE: 68 BPM | RESPIRATION RATE: 16 BRPM | SYSTOLIC BLOOD PRESSURE: 152 MMHG | BODY MASS INDEX: 28.56 KG/M2 | WEIGHT: 171.4 LBS

## 2023-07-11 DIAGNOSIS — D69.6 THROMBOCYTOPENIA, UNSPECIFIED (HCC): ICD-10-CM

## 2023-07-11 DIAGNOSIS — D69.6 THROMBOCYTOPENIA, UNSPECIFIED (HCC): Primary | ICD-10-CM

## 2023-07-11 LAB
BASOPHILS ABSOLUTE: 0 K/CU MM
BASOPHILS RELATIVE PERCENT: 0.5 % (ref 0–1)
DIFFERENTIAL TYPE: ABNORMAL
EOSINOPHILS ABSOLUTE: 0.2 K/CU MM
EOSINOPHILS RELATIVE PERCENT: 3.6 % (ref 0–3)
HCT VFR BLD CALC: 43.9 % (ref 42–52)
HEMOGLOBIN: 15.5 GM/DL (ref 13.5–18)
LYMPHOCYTES ABSOLUTE: 1.1 K/CU MM
LYMPHOCYTES RELATIVE PERCENT: 26.2 % (ref 24–44)
MCH RBC QN AUTO: 32.8 PG (ref 27–31)
MCHC RBC AUTO-ENTMCNC: 35.3 % (ref 32–36)
MCV RBC AUTO: 93 FL (ref 78–100)
MONOCYTES ABSOLUTE: 0.3 K/CU MM
MONOCYTES RELATIVE PERCENT: 7.4 % (ref 0–4)
PDW BLD-RTO: 13.2 % (ref 11.7–14.9)
PLATELET # BLD: 64 K/CU MM (ref 140–440)
PMV BLD AUTO: 11.2 FL (ref 7.5–11.1)
RBC # BLD: 4.72 M/CU MM (ref 4.6–6.2)
SEGMENTED NEUTROPHILS ABSOLUTE COUNT: 2.6 K/CU MM
SEGMENTED NEUTROPHILS RELATIVE PERCENT: 62.3 % (ref 36–66)
WBC # BLD: 4.2 K/CU MM (ref 4–10.5)

## 2023-07-11 PROCEDURE — 1123F ACP DISCUSS/DSCN MKR DOCD: CPT | Performed by: INTERNAL MEDICINE

## 2023-07-11 PROCEDURE — 36415 COLL VENOUS BLD VENIPUNCTURE: CPT

## 2023-07-11 PROCEDURE — 85025 COMPLETE CBC W/AUTO DIFF WBC: CPT

## 2023-07-11 PROCEDURE — 99211 OFF/OP EST MAY X REQ PHY/QHP: CPT

## 2023-07-11 PROCEDURE — 99213 OFFICE O/P EST LOW 20 MIN: CPT | Performed by: INTERNAL MEDICINE

## 2023-07-11 RX ORDER — TAMSULOSIN HYDROCHLORIDE 0.4 MG/1
0.4 CAPSULE ORAL DAILY
COMMUNITY

## 2023-07-11 ASSESSMENT — PATIENT HEALTH QUESTIONNAIRE - PHQ9
SUM OF ALL RESPONSES TO PHQ QUESTIONS 1-9: 0
SUM OF ALL RESPONSES TO PHQ9 QUESTIONS 1 & 2: 0
SUM OF ALL RESPONSES TO PHQ QUESTIONS 1-9: 0
1. LITTLE INTEREST OR PLEASURE IN DOING THINGS: 0
2. FEELING DOWN, DEPRESSED OR HOPELESS: 0

## 2023-07-11 NOTE — PROGRESS NOTES
MA Rooming Questions  Patient: Clint Viramontes  MRN: [id number]    Date: 7/11/2023        1. Do you have any new issues? yes - questions about labs fluctuating         2. Do you need any refills on medications?    no    3. Have you had any imaging done since your last visit?   no    4. Have you been hospitalized or seen in the emergency room since your last visit here?   no    5. Did the patient have a depression screening completed today?  Yes    PHQ-9 Total Score: 0 (7/11/2023  9:36 AM)       PHQ-9 Given to (if applicable):               PHQ-9 Score (if applicable):                     [] Positive     []  Negative              Does question #9 need addressed (if applicable)                     [] Yes    []  No               Helio Trinh CMA

## 2023-09-24 NOTE — PROGRESS NOTES
Patient Name: Giselle Ceja  Patient : 1945  Patient MRN: [id number]     Primary Oncologist: Marissa Garcia MD  Referring Provider: Maxine Carlton MD     Date of Service: 10/19/2023      Chief Complaint:   Chief Complaint   Patient presents with    Follow-up     He came in for follow-up visit. Patient Active Problem List:     Other specified diseases of blood and blood-forming organs     Thrombocytopenia, unspecified     HPI:   Basim Mathew is a pleasant 60-year-old male patient who was referred for evaluation of thrombocytopenia. May 2019  CMP unremarkable. WBC 3.9, hemoglobin 14.7, hematocrit 42.1, .1, platelets 90. PSA was 1.99. Hepatitis C antibody negative. 2019 WBC 5.2, hemoglobin 14.6, hematocrit 40.9, platelets 81. He drinks 2 to 3 glasses of wine daily since he is retired in 2017. At present time he is cutting down. He was told that he had hepatitis B infection in the past. I reviewed his medication list.  We discussed about potential etiologies of macrocytosis and thrombocytopenia, including alcohol use, liver disease, chronic infection, bone marrow disease and/or autoimmune disorder. At one time he was told to have an enlarged liver. He lost weight intentionally. 2019 US of abdomen negative for hepatosplenomegaly. HIV screening was negative. Platelet was 90. SOCORRO and RFnegative. 2019 decreased platelet count. I may consider bone marrow study if platelet continues to drop. 2019 CBC stable. I advise to limit his wine intake to one glass a day. He wants to defer bone marrow study    Labs in 2020 was reviewed. He lost 30 lbs intentionally and felt better. Due to low HbA1c at 5.8, PCP discontinued metformin. 2021 WBC 4.9, hemoglobin 16, platelet 82. He is on Flomax. He denied taking any NSAIDs or blood thinners. He was seen by Dr. Angus Johnson who planned to do resection of the bladder masses.     Ultrasound of the

## 2023-10-19 ENCOUNTER — HOSPITAL ENCOUNTER (OUTPATIENT)
Dept: INFUSION THERAPY | Age: 78
Discharge: HOME | End: 2023-10-19
Payer: MEDICARE

## 2023-10-19 ENCOUNTER — OFFICE VISIT (OUTPATIENT)
Dept: ONCOLOGY | Age: 78
End: 2023-10-19
Payer: MEDICARE

## 2023-10-19 VITALS
HEIGHT: 65 IN | WEIGHT: 169.8 LBS | DIASTOLIC BLOOD PRESSURE: 70 MMHG | BODY MASS INDEX: 28.29 KG/M2 | HEART RATE: 64 BPM | OXYGEN SATURATION: 100 % | SYSTOLIC BLOOD PRESSURE: 154 MMHG | TEMPERATURE: 97.4 F

## 2023-10-19 DIAGNOSIS — D69.6 THROMBOCYTOPENIA, UNSPECIFIED (HCC): Primary | ICD-10-CM

## 2023-10-19 DIAGNOSIS — D69.6 THROMBOCYTOPENIA, UNSPECIFIED (HCC): ICD-10-CM

## 2023-10-19 LAB
ALBUMIN SERPL-MCNC: 4 GM/DL (ref 3.4–5)
ALP BLD-CCNC: 104 IU/L (ref 40–129)
ALT SERPL-CCNC: <5 U/L (ref 10–40)
ANION GAP SERPL CALCULATED.3IONS-SCNC: 7 MMOL/L (ref 4–16)
AST SERPL-CCNC: 29 IU/L (ref 15–37)
BASOPHILS ABSOLUTE: 0 K/CU MM
BASOPHILS RELATIVE PERCENT: 0.6 % (ref 0–1)
BILIRUB SERPL-MCNC: 0.8 MG/DL (ref 0–1)
BUN SERPL-MCNC: 14 MG/DL (ref 6–23)
CALCIUM SERPL-MCNC: 9.2 MG/DL (ref 8.3–10.6)
CHLORIDE BLD-SCNC: 105 MMOL/L (ref 99–110)
CO2: 28 MMOL/L (ref 21–32)
CREAT SERPL-MCNC: 0.8 MG/DL (ref 0.9–1.3)
DIFFERENTIAL TYPE: ABNORMAL
EOSINOPHILS ABSOLUTE: 0.2 K/CU MM
EOSINOPHILS RELATIVE PERCENT: 3.6 % (ref 0–3)
GFR SERPL CREATININE-BSD FRML MDRD: >60 ML/MIN/1.73M2
GLUCOSE SERPL-MCNC: 143 MG/DL (ref 70–99)
HCT VFR BLD CALC: 45.6 % (ref 42–52)
HEMOGLOBIN: 15.8 GM/DL (ref 13.5–18)
LACTATE DEHYDROGENASE: 188 IU/L (ref 120–246)
LYMPHOCYTES ABSOLUTE: 1.5 K/CU MM
LYMPHOCYTES RELATIVE PERCENT: 28.1 % (ref 24–44)
MCH RBC QN AUTO: 32.4 PG (ref 27–31)
MCHC RBC AUTO-ENTMCNC: 34.6 % (ref 32–36)
MCV RBC AUTO: 93.4 FL (ref 78–100)
MONOCYTES ABSOLUTE: 0.4 K/CU MM
MONOCYTES RELATIVE PERCENT: 7.7 % (ref 0–4)
PDW BLD-RTO: 13.2 % (ref 11.7–14.9)
PLATELET # BLD: 72 K/CU MM (ref 140–440)
PMV BLD AUTO: 10.4 FL (ref 7.5–11.1)
POTASSIUM SERPL-SCNC: 4 MMOL/L (ref 3.5–5.1)
RBC # BLD: 4.88 M/CU MM (ref 4.6–6.2)
SEGMENTED NEUTROPHILS ABSOLUTE COUNT: 3.2 K/CU MM
SEGMENTED NEUTROPHILS RELATIVE PERCENT: 60 % (ref 36–66)
SODIUM BLD-SCNC: 140 MMOL/L (ref 135–145)
TOTAL PROTEIN: 7 GM/DL (ref 6.4–8.2)
WBC # BLD: 5.3 K/CU MM (ref 4–10.5)

## 2023-10-19 PROCEDURE — 36415 COLL VENOUS BLD VENIPUNCTURE: CPT

## 2023-10-19 PROCEDURE — 99211 OFF/OP EST MAY X REQ PHY/QHP: CPT

## 2023-10-19 PROCEDURE — 85025 COMPLETE CBC W/AUTO DIFF WBC: CPT

## 2023-10-19 PROCEDURE — 1123F ACP DISCUSS/DSCN MKR DOCD: CPT | Performed by: INTERNAL MEDICINE

## 2023-10-19 PROCEDURE — 83615 LACTATE (LD) (LDH) ENZYME: CPT

## 2023-10-19 PROCEDURE — 80053 COMPREHEN METABOLIC PANEL: CPT

## 2023-10-19 PROCEDURE — 99213 OFFICE O/P EST LOW 20 MIN: CPT | Performed by: INTERNAL MEDICINE

## 2023-10-19 ASSESSMENT — PATIENT HEALTH QUESTIONNAIRE - PHQ9
1. LITTLE INTEREST OR PLEASURE IN DOING THINGS: 0
SUM OF ALL RESPONSES TO PHQ QUESTIONS 1-9: 0
SUM OF ALL RESPONSES TO PHQ9 QUESTIONS 1 & 2: 0
2. FEELING DOWN, DEPRESSED OR HOPELESS: 0
SUM OF ALL RESPONSES TO PHQ QUESTIONS 1-9: 0

## 2023-10-19 NOTE — PROGRESS NOTES
MA Rooming Questions  Patient: Gabriele Gonsalez  MRN: [id number]    Date: 10/19/2023        1. Do you have any new issues?   no         2. Do you need any refills on medications?    no    3. Have you had any imaging done since your last visit? yes - US @      4. Have you been hospitalized or seen in the emergency room since your last visit here?   no    5. Did the patient have a depression screening completed today?  Yes    No data recorded     PHQ-9 Given to (if applicable):               PHQ-9 Score (if applicable):                     [] Positive     []  Negative              Does question #9 need addressed (if applicable)                     [] Yes    []  No               Di Mcgee MA

## 2023-12-13 ENCOUNTER — OFFICE VISIT (OUTPATIENT)
Dept: NEUROLOGY | Age: 78
End: 2023-12-13
Payer: MEDICARE

## 2023-12-13 VITALS
WEIGHT: 170.2 LBS | BODY MASS INDEX: 28.36 KG/M2 | DIASTOLIC BLOOD PRESSURE: 80 MMHG | HEART RATE: 65 BPM | OXYGEN SATURATION: 99 % | SYSTOLIC BLOOD PRESSURE: 122 MMHG | HEIGHT: 65 IN

## 2023-12-13 DIAGNOSIS — G25.0 ESSENTIAL TREMOR: ICD-10-CM

## 2023-12-13 DIAGNOSIS — G20 PARKINSONISM, UNSPECIFIED PARKINSONISM TYPE: Primary | ICD-10-CM

## 2023-12-13 PROCEDURE — 99214 OFFICE O/P EST MOD 30 MIN: CPT | Performed by: NURSE PRACTITIONER

## 2023-12-13 PROCEDURE — 1123F ACP DISCUSS/DSCN MKR DOCD: CPT | Performed by: NURSE PRACTITIONER

## 2023-12-13 NOTE — PROGRESS NOTES
12/13/23    Naheed Rodríguez  1945    Chief Complaint   Patient presents with    Follow-up     Pt presents for follow-up for tremor. History of Present Illness  Nikolas Dennis is a 69 y.o. male presenting today for follow-up of: Essential tremor, parkinsonian features. Abdiaziz takes carbidopa levodopa  mg 1 tablet 3 times daily. His parkinsonian features consistent with bilateral tremor with walking. He has noticed that his handwriting has gotten smaller. He takes shorter steps. He also has an action tremor. He has tried primidone in the past without any improvement in his tremor. He works out at [employer] 3 times per week. He walks 3 miles per day. He would like to increase his carbidopa levodopa. Current Outpatient Medications   Medication Sig Dispense Refill    Multiple Vitamin (MULTIVITAMIN ADULT PO) Take by mouth daily      carbidopa-levodopa (SINEMET)  MG per tablet Take 1 tablet by mouth 4 times daily 120 tablet 5    tamsulosin (FLOMAX) 0.4 MG capsule Take 1 capsule by mouth daily      Acetaminophen (TYLENOL PO) Take by mouth PRN      atorvastatin (LIPITOR) 10 MG tablet Take 1 tablet by mouth daily       No current facility-administered medications for this visit. Physical Exam:  Also present during visit: spouse. Mental Status: A&O x 4, NAD, speech clear, language fluent, comprehension intact, repetition and naming intact     Cranial Nerve Exam:   CN II-XII intact: PERRL, VFF, no nystagmus, no gaze paresis, sensation V1-V3 intact b/l, muscles of facial expression symmetric; hearing intact to conversational tone, palate elevates symmetrically, shoulder elevation symmetric and tongue protrudes midline with movement side to side.      Motor Exam:       Strength 5/5 UE's/LE's b/l  bulk normal   Some ratcheting noted in the right greater than left upper extremity primarily at the elbows, however his tremor was certainly contributing to this as well  No pronator drift     Deep Tendon

## 2024-01-30 NOTE — PROGRESS NOTES
Patient Name: Abdiaziz Dugan  Patient : 1945  Patient MRN: [id number]     Primary Oncologist: Abhi Ramirez MD  Referring Provider: Veronica Mcelroy MD     Date of Service: 2024      Chief Complaint:   Chief Complaint   Patient presents with    Follow-up     4 month follow up.     He came in for follow-up visit.     Patient Active Problem List:     Other specified diseases of blood and blood-forming organs     Thrombocytopenia, unspecified     HPI:   Abdiaziz Dugan is a pleasant 78-year-old male patient who was referred for evaluation of thrombocytopenia.  May 2019  CMP unremarkable.  WBC 3.9, hemoglobin 14.7, hematocrit 42.1, .1, platelets 90.  PSA was 1.99.  Hepatitis C antibody negative.  2019 WBC 5.2, hemoglobin 14.6, hematocrit 40.9, platelets 81.  He drinks 2 to 3 glasses of wine daily since he is retired in 2017. At present time he is cutting down.  He was told that he had hepatitis B infection in the past. I reviewed his medication list.  We discussed about potential etiologies of macrocytosis and thrombocytopenia, including alcohol use, liver disease, chronic infection, bone marrow disease and/or autoimmune disorder.  At one time he was told to have an enlarged liver. He lost weight intentionally.  2019 US of abdomen negative for hepatosplenomegaly.  HIV screening was negative. Platelet was 90. SOCORRO and RFnegative.  2019 decreased platelet count. I may consider bone marrow study if platelet continues to drop.  2019 CBC stable. I advise to limit his wine intake to one glass a day. He wants to defer bone marrow study  Labs in 2020 was reviewed.   He lost 30 lbs intentionally and felt better.  Due to low HbA1c at 5.8, PCP discontinued metformin.  2021 WBC 4.9, hemoglobin 16, platelet 82.  He is on Flomax.  He denied taking any NSAIDs or blood thinners.  He was seen by Dr. Mckeon who planned to do resection of the bladder masses.  Ultrasound

## 2024-02-23 ENCOUNTER — CLINICAL DOCUMENTATION (OUTPATIENT)
Dept: ONCOLOGY | Age: 79
End: 2024-02-23

## 2024-02-23 ENCOUNTER — HOSPITAL ENCOUNTER (OUTPATIENT)
Dept: INFUSION THERAPY | Age: 79
Discharge: HOME | End: 2024-02-23
Payer: MEDICARE

## 2024-02-23 ENCOUNTER — OFFICE VISIT (OUTPATIENT)
Dept: ONCOLOGY | Age: 79
End: 2024-02-23
Payer: MEDICARE

## 2024-02-23 VITALS
WEIGHT: 170 LBS | TEMPERATURE: 98.2 F | HEART RATE: 66 BPM | SYSTOLIC BLOOD PRESSURE: 129 MMHG | BODY MASS INDEX: 28.32 KG/M2 | OXYGEN SATURATION: 98 % | DIASTOLIC BLOOD PRESSURE: 60 MMHG | HEIGHT: 65 IN

## 2024-02-23 DIAGNOSIS — D69.6 THROMBOCYTOPENIA, UNSPECIFIED (HCC): ICD-10-CM

## 2024-02-23 DIAGNOSIS — D69.6 THROMBOCYTOPENIA, UNSPECIFIED (HCC): Primary | ICD-10-CM

## 2024-02-23 LAB
ALBUMIN SERPL-MCNC: 4.2 GM/DL (ref 3.4–5)
ALP BLD-CCNC: 92 IU/L (ref 40–129)
ALT SERPL-CCNC: 15 U/L (ref 10–40)
ANION GAP SERPL CALCULATED.3IONS-SCNC: 8 MMOL/L (ref 7–16)
AST SERPL-CCNC: 29 IU/L (ref 15–37)
BASOPHILS ABSOLUTE: 0 K/CU MM
BASOPHILS RELATIVE PERCENT: 0.6 % (ref 0–1)
BILIRUB SERPL-MCNC: 0.8 MG/DL (ref 0–1)
BUN SERPL-MCNC: 13 MG/DL (ref 6–23)
CALCIUM SERPL-MCNC: 9.1 MG/DL (ref 8.3–10.6)
CHLORIDE BLD-SCNC: 107 MMOL/L (ref 99–110)
CO2: 26 MMOL/L (ref 21–32)
CREAT SERPL-MCNC: 0.8 MG/DL (ref 0.9–1.3)
DIFFERENTIAL TYPE: ABNORMAL
EOSINOPHILS ABSOLUTE: 0.1 K/CU MM
EOSINOPHILS RELATIVE PERCENT: 2.1 % (ref 0–3)
GFR SERPL CREATININE-BSD FRML MDRD: >60 ML/MIN/1.73M2
GLUCOSE SERPL-MCNC: 115 MG/DL (ref 70–99)
HCT VFR BLD CALC: 43 % (ref 42–52)
HEMOGLOBIN: 15.3 GM/DL (ref 13.5–18)
LACTATE DEHYDROGENASE: 176 IU/L (ref 120–246)
LYMPHOCYTES ABSOLUTE: 1.4 K/CU MM
LYMPHOCYTES RELATIVE PERCENT: 27.2 % (ref 24–44)
MCH RBC QN AUTO: 32.5 PG (ref 27–31)
MCHC RBC AUTO-ENTMCNC: 35.6 % (ref 32–36)
MCV RBC AUTO: 91.3 FL (ref 78–100)
MONOCYTES ABSOLUTE: 0.5 K/CU MM
MONOCYTES RELATIVE PERCENT: 9.7 % (ref 0–4)
PDW BLD-RTO: 13.4 % (ref 11.7–14.9)
PLATELET # BLD: 73 K/CU MM (ref 140–440)
PMV BLD AUTO: 10.6 FL (ref 7.5–11.1)
POTASSIUM SERPL-SCNC: 3.9 MMOL/L (ref 3.5–5.1)
RBC # BLD: 4.71 M/CU MM (ref 4.6–6.2)
SEGMENTED NEUTROPHILS ABSOLUTE COUNT: 3.2 K/CU MM
SEGMENTED NEUTROPHILS RELATIVE PERCENT: 60.4 % (ref 36–66)
SODIUM BLD-SCNC: 141 MMOL/L (ref 135–145)
TOTAL PROTEIN: 6.5 GM/DL (ref 6.4–8.2)
WBC # BLD: 5.3 K/CU MM (ref 4–10.5)

## 2024-02-23 PROCEDURE — 83615 LACTATE (LD) (LDH) ENZYME: CPT

## 2024-02-23 PROCEDURE — 99213 OFFICE O/P EST LOW 20 MIN: CPT | Performed by: INTERNAL MEDICINE

## 2024-02-23 PROCEDURE — 80053 COMPREHEN METABOLIC PANEL: CPT

## 2024-02-23 PROCEDURE — 36415 COLL VENOUS BLD VENIPUNCTURE: CPT

## 2024-02-23 PROCEDURE — 1123F ACP DISCUSS/DSCN MKR DOCD: CPT | Performed by: INTERNAL MEDICINE

## 2024-02-23 PROCEDURE — 85025 COMPLETE CBC W/AUTO DIFF WBC: CPT

## 2024-02-23 PROCEDURE — 99211 OFF/OP EST MAY X REQ PHY/QHP: CPT

## 2024-02-23 ASSESSMENT — PATIENT HEALTH QUESTIONNAIRE - PHQ9
SUM OF ALL RESPONSES TO PHQ QUESTIONS 1-9: 0
1. LITTLE INTEREST OR PLEASURE IN DOING THINGS: 0
SUM OF ALL RESPONSES TO PHQ QUESTIONS 1-9: 0
SUM OF ALL RESPONSES TO PHQ QUESTIONS 1-9: 0
SUM OF ALL RESPONSES TO PHQ9 QUESTIONS 1 & 2: 0
2. FEELING DOWN, DEPRESSED OR HOPELESS: 0
SUM OF ALL RESPONSES TO PHQ QUESTIONS 1-9: 0

## 2024-02-23 NOTE — PROGRESS NOTES
MA Rooming Questions  Patient: Abdiaziz Dugan  MRN: [id number]    Date: 2/23/2024        1. Do you have any new issues?   yes - Itchiness in Groin Area  this is not constant. States has being using an aloe vera hand lotion to help the itching this helps but the itching comes back.     2. Do you need any refills on medications?    no    3. Have you had any imaging done since your last visit?   no    4. Have you been hospitalized or seen in the emergency room since your last visit here?   no    5. Did the patient have a depression screening completed today? Yes    PHQ-9 Total Score: 0 (2/23/2024 11:07 AM)       PHQ-9 Given to (if applicable):               PHQ-9 Score (if applicable):                     [] Positive     [x]  Negative              Does question #9 need addressed (if applicable)                     [] Yes    []  No               Shelli Montiel MA

## 2024-06-07 ENCOUNTER — TELEPHONE (OUTPATIENT)
Dept: NEUROLOGY | Age: 79
End: 2024-06-07

## 2024-07-24 NOTE — PROGRESS NOTES
Patient Name: Abdiaziz Dugan  Patient : 1945  Patient MRN: [id number]     Primary Oncologist: Abhi Ramirez MD  Referring Provider: Veronica Mcelroy MD     Date of Service: 2024      Chief Complaint:   Chief Complaint   Patient presents with    Follow-up     He came in for follow-up visit.     Patient Active Problem List:     Other specified diseases of blood and blood-forming organs     Thrombocytopenia, unspecified     HPI:   Abdiaziz Dugan is a pleasant 78-year-old male patient who was referred for evaluation of thrombocytopenia.  May 2019  CMP unremarkable.  WBC 3.9, hemoglobin 14.7, hematocrit 42.1, .1, platelets 90.  PSA was 1.99.  Hepatitis C antibody negative.  2019 WBC 5.2, hemoglobin 14.6, hematocrit 40.9, platelets 81.  He drinks 2 to 3 glasses of wine daily since he is retired in 2017. At present time he is cutting down.  He was told that he had hepatitis B infection in the past. I reviewed his medication list.  We discussed about potential etiologies of macrocytosis and thrombocytopenia, including alcohol use, liver disease, chronic infection, bone marrow disease and/or autoimmune disorder.  At one time he was told to have an enlarged liver. He lost weight intentionally.  2019 US of abdomen negative for hepatosplenomegaly.  HIV screening was negative. Platelet was 90. SOCORRO and RFnegative.  2019 decreased platelet count. I may consider bone marrow study if platelet continues to drop.  2019 CBC stable. I advise to limit his wine intake to one glass a day. He wants to defer bone marrow study  Labs in 2020 was reviewed.   He lost 30 lbs intentionally and felt better.  Due to low HbA1c at 5.8, PCP discontinued metformin.  2021 WBC 4.9, hemoglobin 16, platelet 82.  He is on Flomax.  He denied taking any NSAIDs or blood thinners.  He was seen by Dr. Mckeon who planned to do resection of the bladder masses.  Ultrasound of the kidney and

## 2024-08-23 ENCOUNTER — HOSPITAL ENCOUNTER (OUTPATIENT)
Dept: INFUSION THERAPY | Age: 79
Discharge: HOME | End: 2024-08-23
Payer: MEDICARE

## 2024-08-23 ENCOUNTER — OFFICE VISIT (OUTPATIENT)
Dept: ONCOLOGY | Age: 79
End: 2024-08-23
Payer: MEDICARE

## 2024-08-23 VITALS
SYSTOLIC BLOOD PRESSURE: 140 MMHG | BODY MASS INDEX: 27.89 KG/M2 | TEMPERATURE: 97.7 F | HEART RATE: 65 BPM | OXYGEN SATURATION: 94 % | HEIGHT: 65 IN | WEIGHT: 167.4 LBS | DIASTOLIC BLOOD PRESSURE: 67 MMHG

## 2024-08-23 DIAGNOSIS — D69.6 THROMBOCYTOPENIA, UNSPECIFIED (HCC): Primary | ICD-10-CM

## 2024-08-23 DIAGNOSIS — D69.6 THROMBOCYTOPENIA, UNSPECIFIED (HCC): ICD-10-CM

## 2024-08-23 LAB
BASOPHILS ABSOLUTE: 0.1 K/CU MM
BASOPHILS RELATIVE PERCENT: 0.8 % (ref 0–1)
DIFFERENTIAL TYPE: ABNORMAL
EOSINOPHILS ABSOLUTE: 0.2 K/CU MM
EOSINOPHILS RELATIVE PERCENT: 3.7 % (ref 0–3)
HCT VFR BLD CALC: 43.5 % (ref 42–52)
HEMOGLOBIN: 15.1 GM/DL (ref 13.5–18)
LYMPHOCYTES ABSOLUTE: 1.2 K/CU MM
LYMPHOCYTES RELATIVE PERCENT: 19.5 % (ref 24–44)
MCH RBC QN AUTO: 32.6 PG (ref 27–31)
MCHC RBC AUTO-ENTMCNC: 34.7 % (ref 32–36)
MCV RBC AUTO: 94 FL (ref 78–100)
MONOCYTES ABSOLUTE: 0.7 K/CU MM
MONOCYTES RELATIVE PERCENT: 12.6 % (ref 0–4)
NEUTROPHILS ABSOLUTE: 3.7 K/CU MM
NEUTROPHILS RELATIVE PERCENT: 63.4 % (ref 36–66)
PDW BLD-RTO: 13.7 % (ref 11.7–14.9)
PLATELET # BLD: 81 K/CU MM (ref 140–440)
PMV BLD AUTO: 10.2 FL (ref 7.5–11.1)
RBC # BLD: 4.63 M/CU MM (ref 4.6–6.2)
WBC # BLD: 5.9 K/CU MM (ref 4–10.5)

## 2024-08-23 PROCEDURE — 99211 OFF/OP EST MAY X REQ PHY/QHP: CPT

## 2024-08-23 PROCEDURE — 85025 COMPLETE CBC W/AUTO DIFF WBC: CPT

## 2024-08-23 PROCEDURE — 99213 OFFICE O/P EST LOW 20 MIN: CPT | Performed by: INTERNAL MEDICINE

## 2024-08-23 PROCEDURE — 1123F ACP DISCUSS/DSCN MKR DOCD: CPT | Performed by: INTERNAL MEDICINE

## 2024-08-23 PROCEDURE — 36415 COLL VENOUS BLD VENIPUNCTURE: CPT

## 2024-08-23 RX ORDER — MAGNESIUM OXIDE 400 MG/1
400 TABLET ORAL DAILY
COMMUNITY

## 2024-08-23 ASSESSMENT — PATIENT HEALTH QUESTIONNAIRE - PHQ9
SUM OF ALL RESPONSES TO PHQ QUESTIONS 1-9: 0
2. FEELING DOWN, DEPRESSED OR HOPELESS: NOT AT ALL
SUM OF ALL RESPONSES TO PHQ QUESTIONS 1-9: 0
SUM OF ALL RESPONSES TO PHQ QUESTIONS 1-9: 0
SUM OF ALL RESPONSES TO PHQ9 QUESTIONS 1 & 2: 0
SUM OF ALL RESPONSES TO PHQ QUESTIONS 1-9: 0

## 2024-08-23 NOTE — PROGRESS NOTES
MA Rooming Questions  Patient: Abdiaziz Dugan  MRN: [id number]    Date: 8/23/2024        1. Do you have any new issues?   no         2. Do you need any refills on medications?    no    3. Have you had any imaging done since your last visit?   no    4. Have you been hospitalized or seen in the emergency room since your last visit here?   no    5. Did the patient have a depression screening completed today? No    PHQ-9 Total Score: 0 (8/23/2024  9:16 AM)       PHQ-9 Given to (if applicable):               PHQ-9 Score (if applicable):                     [] Positive     []  Negative              Does question #9 need addressed (if applicable)                     [] Yes    []  No               Opal Aiken CMA

## 2025-02-16 NOTE — PROGRESS NOTES
Patient Name: Abdiaziz Dugan  Patient : 1945  Patient MRN: [id number]     Primary Oncologist: Abhi Ramirez MD  Referring Provider: Veronica Mcelroy MD     Date of Service: 3/18/2025      Chief Complaint:   Chief Complaint   Patient presents with    Follow-up     He came in for follow-up visit.     Patient Active Problem List:     Other specified diseases of blood and blood-forming organs     Thrombocytopenia, unspecified     HPI:   Abdiaziz Dugan is a pleasant 79-year-old male patient who was referred for evaluation of thrombocytopenia.  May 2019  CMP unremarkable.  WBC 3.9, hemoglobin 14.7, hematocrit 42.1, .1, platelets 90.  PSA was 1.99.  Hepatitis C antibody negative.  2019 WBC 5.2, Hg 14.6, hematocrit 40.9, platelets 81.  He drinks 2 to 3 glasses of wine daily since he is retired in 2017. At present time he is cutting down.  He was told that he had hepatitis B infection in the past. I reviewed his medication list.  We discussed about potential etiologies of macrocytosis and thrombocytopenia, including alcohol use, liver disease, chronic infection, bone marrow disease and/or autoimmune disorder.  At one time he was told to have an enlarged liver. He lost weight intentionally.  2019 US abdomen negative for hepatosplenomegaly.  HIV screening negative. Platelet 90. SOCORRO and RF negative.  2019 decreased platelet count. I may consider bone marrow study if platelet continues to drop.  2019 CBC stable. I advise to limit his wine intake to one glass a day. He wants to defer bone marrow study  Labs in 2020 was reviewed.   He lost 30 lbs intentionally and felt better.  Due to low HbA1c at 5.8, PCP discontinued metformin.  2021 WBC 4.9, hemoglobin 16, platelet 82.  He is on Flomax.  He denied taking any NSAIDs or blood thinners.  He was seen by Dr. Mckeon who planned to do resection of the bladder masses.  21 Ultrasound of the kidney and bladder showed 2

## 2025-03-18 ENCOUNTER — OFFICE VISIT (OUTPATIENT)
Dept: ONCOLOGY | Age: 80
End: 2025-03-18
Payer: MEDICARE

## 2025-03-18 ENCOUNTER — HOSPITAL ENCOUNTER (OUTPATIENT)
Dept: INFUSION THERAPY | Age: 80
Discharge: HOME | End: 2025-03-18
Payer: MEDICARE

## 2025-03-18 VITALS
OXYGEN SATURATION: 98 % | HEIGHT: 65 IN | SYSTOLIC BLOOD PRESSURE: 130 MMHG | RESPIRATION RATE: 16 BRPM | BODY MASS INDEX: 27.89 KG/M2 | TEMPERATURE: 98.8 F | DIASTOLIC BLOOD PRESSURE: 68 MMHG | WEIGHT: 167.4 LBS | HEART RATE: 75 BPM

## 2025-03-18 DIAGNOSIS — D69.6 THROMBOCYTOPENIA, UNSPECIFIED: Primary | ICD-10-CM

## 2025-03-18 DIAGNOSIS — D69.6 THROMBOCYTOPENIA, UNSPECIFIED: ICD-10-CM

## 2025-03-18 LAB
ALBUMIN SERPL-MCNC: 4 G/DL (ref 3.4–5)
ALBUMIN/GLOB SERPL: 1.3 {RATIO} (ref 1.1–2.2)
ALP SERPL-CCNC: 97 U/L (ref 40–129)
ALT SERPL-CCNC: 24 U/L (ref 10–40)
ANION GAP SERPL CALCULATED.3IONS-SCNC: 10 MMOL/L (ref 9–17)
AST SERPL-CCNC: 42 U/L (ref 15–37)
BASOPHILS # BLD: 0.02 K/UL
BASOPHILS NFR BLD: 0 % (ref 0–1)
BILIRUB SERPL-MCNC: 0.9 MG/DL (ref 0–1)
BUN SERPL-MCNC: 15 MG/DL (ref 7–20)
CALCIUM SERPL-MCNC: 9.4 MG/DL (ref 8.3–10.6)
CHLORIDE SERPL-SCNC: 105 MMOL/L (ref 99–110)
CO2 SERPL-SCNC: 25 MMOL/L (ref 21–32)
CREAT SERPL-MCNC: 0.9 MG/DL (ref 0.8–1.3)
EOSINOPHIL # BLD: 0.17 K/UL
EOSINOPHILS RELATIVE PERCENT: 3 % (ref 0–3)
ERYTHROCYTE [DISTWIDTH] IN BLOOD BY AUTOMATED COUNT: 13.7 % (ref 11.7–14.9)
GFR, ESTIMATED: 86 ML/MIN/1.73M2
GLUCOSE SERPL-MCNC: 182 MG/DL (ref 74–99)
HCT VFR BLD AUTO: 44 % (ref 42–52)
HGB BLD-MCNC: 15.1 G/DL (ref 13.5–18)
LDH SERPL-CCNC: 213 U/L (ref 100–190)
LYMPHOCYTES NFR BLD: 1.34 K/UL
LYMPHOCYTES RELATIVE PERCENT: 27 % (ref 24–44)
MCH RBC QN AUTO: 32.6 PG (ref 27–31)
MCHC RBC AUTO-ENTMCNC: 34.3 G/DL (ref 32–36)
MCV RBC AUTO: 95 FL (ref 78–100)
MONOCYTES NFR BLD: 0.5 K/UL
MONOCYTES NFR BLD: 10 % (ref 0–4)
NEUTROPHILS NFR BLD: 60 % (ref 36–66)
NEUTS SEG NFR BLD: 3 K/UL
PLATELET # BLD AUTO: 69 K/UL (ref 140–440)
PMV BLD AUTO: 11 FL (ref 7.5–11.1)
POTASSIUM SERPL-SCNC: 4 MMOL/L (ref 3.5–5.1)
PROT SERPL-MCNC: 7.1 G/DL (ref 6.4–8.2)
RBC # BLD AUTO: 4.63 M/UL (ref 4.6–6.2)
SODIUM SERPL-SCNC: 139 MMOL/L (ref 136–145)
WBC OTHER # BLD: 5 K/UL (ref 4–10.5)

## 2025-03-18 PROCEDURE — 83615 LACTATE (LD) (LDH) ENZYME: CPT

## 2025-03-18 PROCEDURE — 1159F MED LIST DOCD IN RCRD: CPT | Performed by: INTERNAL MEDICINE

## 2025-03-18 PROCEDURE — 1126F AMNT PAIN NOTED NONE PRSNT: CPT | Performed by: INTERNAL MEDICINE

## 2025-03-18 PROCEDURE — 36415 COLL VENOUS BLD VENIPUNCTURE: CPT

## 2025-03-18 PROCEDURE — 99213 OFFICE O/P EST LOW 20 MIN: CPT | Performed by: INTERNAL MEDICINE

## 2025-03-18 PROCEDURE — 1123F ACP DISCUSS/DSCN MKR DOCD: CPT | Performed by: INTERNAL MEDICINE

## 2025-03-18 PROCEDURE — 85025 COMPLETE CBC W/AUTO DIFF WBC: CPT

## 2025-03-18 PROCEDURE — 99212 OFFICE O/P EST SF 10 MIN: CPT

## 2025-03-18 PROCEDURE — 80053 COMPREHEN METABOLIC PANEL: CPT

## 2025-03-18 ASSESSMENT — PATIENT HEALTH QUESTIONNAIRE - PHQ9
1. LITTLE INTEREST OR PLEASURE IN DOING THINGS: NOT AT ALL
SUM OF ALL RESPONSES TO PHQ QUESTIONS 1-9: 0
2. FEELING DOWN, DEPRESSED OR HOPELESS: NOT AT ALL
SUM OF ALL RESPONSES TO PHQ QUESTIONS 1-9: 0

## 2025-03-18 NOTE — PROGRESS NOTES
MA Rooming Questions  Patient: Abdiaziz Dugan  MRN: [id number]    Date: 3/18/2025        1. Do you have any new issues?   yes - bruising all over         2. Do you need any refills on medications?    no    3. Have you had any imaging done since your last visit?   no    4. Have you been hospitalized or seen in the emergency room since your last visit here?   no    5. Did the patient have a depression screening completed today? Yes    No data recorded     PHQ-9 Given to (if applicable):               PHQ-9 Score (if applicable):                     [] Positive     []  Negative              Does question #9 need addressed (if applicable)                     [] Yes    []  No               Cristela Fish CMA